# Patient Record
Sex: MALE | Race: WHITE | HISPANIC OR LATINO | Employment: OTHER | URBAN - METROPOLITAN AREA
[De-identification: names, ages, dates, MRNs, and addresses within clinical notes are randomized per-mention and may not be internally consistent; named-entity substitution may affect disease eponyms.]

---

## 2020-11-26 ENCOUNTER — HOSPITAL ENCOUNTER (OUTPATIENT)
Facility: HOSPITAL | Age: 60
Setting detail: HOSPITAL OUTPATIENT SURGERY
Discharge: HOME | End: 2020-11-26
Attending: RADIOLOGY | Admitting: RADIOLOGY

## 2020-11-26 ENCOUNTER — HOSPITAL ENCOUNTER (INPATIENT)
Facility: HOSPITAL | Age: 60
LOS: 5 days | Discharge: HOME | End: 2020-12-01
Attending: INTERNAL MEDICINE | Admitting: FAMILY MEDICINE

## 2020-11-26 VITALS
RESPIRATION RATE: 18 BRPM | HEART RATE: 66 BPM | SYSTOLIC BLOOD PRESSURE: 148 MMHG | DIASTOLIC BLOOD PRESSURE: 81 MMHG | OXYGEN SATURATION: 100 %

## 2020-11-26 DIAGNOSIS — R13.11 ORAL PHASE DYSPHAGIA: ICD-10-CM

## 2020-11-26 DIAGNOSIS — I48.0 PAROXYSMAL ATRIAL FIBRILLATION (HCC): ICD-10-CM

## 2020-11-26 DIAGNOSIS — N18.6 ESRD (END STAGE RENAL DISEASE) (HCC): ICD-10-CM

## 2020-11-26 DIAGNOSIS — R47.01 APHASIA: ICD-10-CM

## 2020-11-26 DIAGNOSIS — H35.033 BLIND HYPERTENSIVE EYE, BILATERAL: ICD-10-CM

## 2020-11-26 DIAGNOSIS — I63.9 ACUTE CVA (CEREBROVASCULAR ACCIDENT) (HCC): Primary | ICD-10-CM

## 2020-11-26 PROBLEM — I10 HTN (HYPERTENSION): Status: ACTIVE | Noted: 2020-11-26

## 2020-11-26 PROBLEM — N18.9 CHRONIC RENAL FAILURE: Status: ACTIVE | Noted: 2020-11-26

## 2020-11-26 PROBLEM — E11.9 DM (DIABETES MELLITUS) (HCC): Status: ACTIVE | Noted: 2020-11-26

## 2020-11-26 LAB — SARS-COV-2 RNA RESP QL NAA+PROBE: NOT DETECTED

## 2020-11-26 PROCEDURE — 25010000002 MIDAZOLAM PER 1 MG: Performed by: RADIOLOGY

## 2020-11-26 PROCEDURE — C1894 INTRO/SHEATH, NON-LASER: HCPCS | Performed by: RADIOLOGY

## 2020-11-26 PROCEDURE — C1887 CATHETER, GUIDING: HCPCS | Performed by: RADIOLOGY

## 2020-11-26 PROCEDURE — C2628 CATHETER, OCCLUSION: HCPCS | Performed by: RADIOLOGY

## 2020-11-26 PROCEDURE — 61645 PERQ ART M-THROMBECT &/NFS: CPT | Performed by: RADIOLOGY

## 2020-11-26 PROCEDURE — 25010000002 FENTANYL CITRATE (PF) 100 MCG/2ML SOLUTION: Performed by: RADIOLOGY

## 2020-11-26 PROCEDURE — C1769 GUIDE WIRE: HCPCS | Performed by: RADIOLOGY

## 2020-11-26 PROCEDURE — 87635 SARS-COV-2 COVID-19 AMP PRB: CPT | Performed by: NURSE PRACTITIONER

## 2020-11-26 PROCEDURE — C1760 CLOSURE DEV, VASC: HCPCS | Performed by: RADIOLOGY

## 2020-11-26 PROCEDURE — 99291 CRITICAL CARE FIRST HOUR: CPT | Performed by: INTERNAL MEDICINE

## 2020-11-26 PROCEDURE — C1773 RET DEV, INSERTABLE: HCPCS | Performed by: RADIOLOGY

## 2020-11-26 PROCEDURE — 99254 IP/OBS CNSLTJ NEW/EST MOD 60: CPT | Performed by: NURSE PRACTITIONER

## 2020-11-26 PROCEDURE — G0269 OCCLUSIVE DEVICE IN VEIN ART: HCPCS | Performed by: RADIOLOGY

## 2020-11-26 PROCEDURE — 0 IODIXANOL PER 1 ML: Performed by: RADIOLOGY

## 2020-11-26 RX ORDER — MIDAZOLAM HYDROCHLORIDE 1 MG/ML
INJECTION INTRAMUSCULAR; INTRAVENOUS AS NEEDED
Status: DISCONTINUED | OUTPATIENT
Start: 2020-11-26 | End: 2020-11-26 | Stop reason: HOSPADM

## 2020-11-26 RX ORDER — ATORVASTATIN CALCIUM 40 MG/1
80 TABLET, FILM COATED ORAL NIGHTLY
Status: DISCONTINUED | OUTPATIENT
Start: 2020-11-26 | End: 2020-11-30

## 2020-11-26 RX ORDER — IODIXANOL 320 MG/ML
INJECTION, SOLUTION INTRAVASCULAR AS NEEDED
Status: DISCONTINUED | OUTPATIENT
Start: 2020-11-26 | End: 2020-11-26 | Stop reason: HOSPADM

## 2020-11-26 RX ORDER — ASPIRIN 300 MG/1
300 SUPPOSITORY RECTAL DAILY
Status: DISCONTINUED | OUTPATIENT
Start: 2020-11-27 | End: 2020-11-27

## 2020-11-26 RX ORDER — SODIUM CHLORIDE 0.9 % (FLUSH) 0.9 %
10 SYRINGE (ML) INJECTION AS NEEDED
Status: DISCONTINUED | OUTPATIENT
Start: 2020-11-26 | End: 2020-12-01 | Stop reason: HOSPADM

## 2020-11-26 RX ORDER — FENTANYL CITRATE 50 UG/ML
INJECTION, SOLUTION INTRAMUSCULAR; INTRAVENOUS AS NEEDED
Status: DISCONTINUED | OUTPATIENT
Start: 2020-11-26 | End: 2020-11-26 | Stop reason: HOSPADM

## 2020-11-26 RX ORDER — ASPIRIN 325 MG
325 TABLET ORAL DAILY
Status: DISCONTINUED | OUTPATIENT
Start: 2020-11-27 | End: 2020-11-27

## 2020-11-26 RX ORDER — SODIUM CHLORIDE 0.9 % (FLUSH) 0.9 %
10 SYRINGE (ML) INJECTION EVERY 12 HOURS SCHEDULED
Status: DISCONTINUED | OUTPATIENT
Start: 2020-11-26 | End: 2020-12-01 | Stop reason: HOSPADM

## 2020-11-27 ENCOUNTER — APPOINTMENT (OUTPATIENT)
Dept: NEPHROLOGY | Facility: HOSPITAL | Age: 60
End: 2020-11-27

## 2020-11-27 ENCOUNTER — APPOINTMENT (OUTPATIENT)
Dept: CT IMAGING | Facility: HOSPITAL | Age: 60
End: 2020-11-27

## 2020-11-27 ENCOUNTER — APPOINTMENT (OUTPATIENT)
Dept: CARDIOLOGY | Facility: HOSPITAL | Age: 60
End: 2020-11-27

## 2020-11-27 LAB
ALBUMIN SERPL-MCNC: 3.3 G/DL (ref 3.5–5.2)
ALBUMIN/GLOB SERPL: 1.2 G/DL
ALP SERPL-CCNC: 99 U/L (ref 39–117)
ALT SERPL W P-5'-P-CCNC: 10 U/L (ref 1–41)
ANION GAP SERPL CALCULATED.3IONS-SCNC: 13 MMOL/L (ref 5–15)
AST SERPL-CCNC: 7 U/L (ref 1–40)
BASOPHILS # BLD AUTO: 0.07 10*3/MM3 (ref 0–0.2)
BASOPHILS NFR BLD AUTO: 1 % (ref 0–1.5)
BILIRUB SERPL-MCNC: 0.6 MG/DL (ref 0–1.2)
BUN SERPL-MCNC: 41 MG/DL (ref 6–20)
BUN/CREAT SERPL: 4.5 (ref 7–25)
CALCIUM SPEC-SCNC: 8.7 MG/DL (ref 8.6–10.5)
CHLORIDE SERPL-SCNC: 97 MMOL/L (ref 98–107)
CHOLEST SERPL-MCNC: 76 MG/DL (ref 0–200)
CO2 SERPL-SCNC: 29 MMOL/L (ref 22–29)
CREAT SERPL-MCNC: 9.12 MG/DL (ref 0.76–1.27)
DEPRECATED RDW RBC AUTO: 54.6 FL (ref 37–54)
EOSINOPHIL # BLD AUTO: 0.16 10*3/MM3 (ref 0–0.4)
EOSINOPHIL NFR BLD AUTO: 2.4 % (ref 0.3–6.2)
ERYTHROCYTE [DISTWIDTH] IN BLOOD BY AUTOMATED COUNT: 15.7 % (ref 12.3–15.4)
GFR SERPL CREATININE-BSD FRML MDRD: 6 ML/MIN/1.73
GFR SERPL CREATININE-BSD FRML MDRD: 7 ML/MIN/1.73
GLOBULIN UR ELPH-MCNC: 2.8 GM/DL
GLUCOSE SERPL-MCNC: 98 MG/DL (ref 65–99)
HBA1C MFR BLD: 6 % (ref 4.8–5.6)
HCT VFR BLD AUTO: 29.5 % (ref 37.5–51)
HDLC SERPL-MCNC: 34 MG/DL (ref 40–60)
HGB BLD-MCNC: 9.7 G/DL (ref 13–17.7)
IMM GRANULOCYTES # BLD AUTO: 0.03 10*3/MM3 (ref 0–0.05)
IMM GRANULOCYTES NFR BLD AUTO: 0.4 % (ref 0–0.5)
LDLC SERPL CALC-MCNC: 27 MG/DL (ref 0–100)
LDLC/HDLC SERPL: 0.85 {RATIO}
LYMPHOCYTES # BLD AUTO: 0.83 10*3/MM3 (ref 0.7–3.1)
LYMPHOCYTES NFR BLD AUTO: 12.4 % (ref 19.6–45.3)
MAGNESIUM SERPL-MCNC: 2.2 MG/DL (ref 1.6–2.6)
MCH RBC QN AUTO: 32.4 PG (ref 26.6–33)
MCHC RBC AUTO-ENTMCNC: 32.9 G/DL (ref 31.5–35.7)
MCV RBC AUTO: 98.7 FL (ref 79–97)
MONOCYTES # BLD AUTO: 0.74 10*3/MM3 (ref 0.1–0.9)
MONOCYTES NFR BLD AUTO: 11.1 % (ref 5–12)
NEUTROPHILS NFR BLD AUTO: 4.84 10*3/MM3 (ref 1.7–7)
NEUTROPHILS NFR BLD AUTO: 72.7 % (ref 42.7–76)
NRBC BLD AUTO-RTO: 0 /100 WBC (ref 0–0.2)
PHOSPHATE SERPL-MCNC: 5.5 MG/DL (ref 2.5–4.5)
PLATELET # BLD AUTO: 101 10*3/MM3 (ref 140–450)
PMV BLD AUTO: 10.3 FL (ref 6–12)
POTASSIUM SERPL-SCNC: 3.6 MMOL/L (ref 3.5–5.2)
PROT SERPL-MCNC: 6.1 G/DL (ref 6–8.5)
RBC # BLD AUTO: 2.99 10*6/MM3 (ref 4.14–5.8)
SODIUM SERPL-SCNC: 139 MMOL/L (ref 136–145)
TRIGL SERPL-MCNC: 65 MG/DL (ref 0–150)
VLDLC SERPL-MCNC: 15 MG/DL (ref 5–40)
WBC # BLD AUTO: 6.67 10*3/MM3 (ref 3.4–10.8)

## 2020-11-27 PROCEDURE — 93306 TTE W/DOPPLER COMPLETE: CPT

## 2020-11-27 PROCEDURE — 03JY3ZZ INSPECTION OF UPPER ARTERY, PERCUTANEOUS APPROACH: ICD-10-PCS | Performed by: RADIOLOGY

## 2020-11-27 PROCEDURE — B3171ZZ FLUOROSCOPY OF LEFT INTERNAL CAROTID ARTERY USING LOW OSMOLAR CONTRAST: ICD-10-PCS | Performed by: RADIOLOGY

## 2020-11-27 PROCEDURE — 99233 SBSQ HOSP IP/OBS HIGH 50: CPT | Performed by: INTERNAL MEDICINE

## 2020-11-27 PROCEDURE — 83036 HEMOGLOBIN GLYCOSYLATED A1C: CPT | Performed by: RADIOLOGY

## 2020-11-27 PROCEDURE — 70450 CT HEAD/BRAIN W/O DYE: CPT

## 2020-11-27 PROCEDURE — 92610 EVALUATE SWALLOWING FUNCTION: CPT

## 2020-11-27 PROCEDURE — 83735 ASSAY OF MAGNESIUM: CPT | Performed by: INTERNAL MEDICINE

## 2020-11-27 PROCEDURE — 93306 TTE W/DOPPLER COMPLETE: CPT | Performed by: INTERNAL MEDICINE

## 2020-11-27 PROCEDURE — 97165 OT EVAL LOW COMPLEX 30 MIN: CPT

## 2020-11-27 PROCEDURE — 85025 COMPLETE CBC W/AUTO DIFF WBC: CPT | Performed by: INTERNAL MEDICINE

## 2020-11-27 PROCEDURE — 5A1D70Z PERFORMANCE OF URINARY FILTRATION, INTERMITTENT, LESS THAN 6 HOURS PER DAY: ICD-10-PCS | Performed by: INTERNAL MEDICINE

## 2020-11-27 PROCEDURE — 97162 PT EVAL MOD COMPLEX 30 MIN: CPT

## 2020-11-27 PROCEDURE — 84100 ASSAY OF PHOSPHORUS: CPT | Performed by: INTERNAL MEDICINE

## 2020-11-27 PROCEDURE — 80061 LIPID PANEL: CPT | Performed by: RADIOLOGY

## 2020-11-27 PROCEDURE — 80053 COMPREHEN METABOLIC PANEL: CPT | Performed by: INTERNAL MEDICINE

## 2020-11-27 PROCEDURE — B3141ZZ FLUOROSCOPY OF LEFT COMMON CAROTID ARTERY USING LOW OSMOLAR CONTRAST: ICD-10-PCS | Performed by: RADIOLOGY

## 2020-11-27 PROCEDURE — B31R1ZZ FLUOROSCOPY OF INTRACRANIAL ARTERIES USING LOW OSMOLAR CONTRAST: ICD-10-PCS | Performed by: RADIOLOGY

## 2020-11-27 RX ORDER — ASPIRIN 81 MG/1
81 TABLET, CHEWABLE ORAL DAILY
Status: DISCONTINUED | OUTPATIENT
Start: 2020-11-27 | End: 2020-12-01 | Stop reason: HOSPADM

## 2020-11-27 RX ORDER — CLOPIDOGREL BISULFATE 75 MG/1
75 TABLET ORAL DAILY
Status: DISCONTINUED | OUTPATIENT
Start: 2020-11-27 | End: 2020-12-01

## 2020-11-27 RX ADMIN — SODIUM CHLORIDE, PRESERVATIVE FREE 10 ML: 5 INJECTION INTRAVENOUS at 21:16

## 2020-11-27 RX ADMIN — ATORVASTATIN CALCIUM 80 MG: 40 TABLET, FILM COATED ORAL at 21:16

## 2020-11-27 RX ADMIN — CLOPIDOGREL BISULFATE 75 MG: 75 TABLET ORAL at 21:16

## 2020-11-27 RX ADMIN — SODIUM CHLORIDE, PRESERVATIVE FREE 10 ML: 5 INJECTION INTRAVENOUS at 08:17

## 2020-11-27 RX ADMIN — ASPIRIN 81 MG CHEWABLE TABLET 81 MG: 81 TABLET CHEWABLE at 21:15

## 2020-11-28 ENCOUNTER — APPOINTMENT (OUTPATIENT)
Dept: MRI IMAGING | Facility: HOSPITAL | Age: 60
End: 2020-11-28

## 2020-11-28 LAB
ANION GAP SERPL CALCULATED.3IONS-SCNC: 13 MMOL/L (ref 5–15)
BUN SERPL-MCNC: 26 MG/DL (ref 6–20)
BUN/CREAT SERPL: 3.6 (ref 7–25)
CALCIUM SPEC-SCNC: 9.1 MG/DL (ref 8.6–10.5)
CHLORIDE SERPL-SCNC: 103 MMOL/L (ref 98–107)
CO2 SERPL-SCNC: 24 MMOL/L (ref 22–29)
CREAT SERPL-MCNC: 7.26 MG/DL (ref 0.76–1.27)
DEPRECATED RDW RBC AUTO: 58.9 FL (ref 37–54)
ERYTHROCYTE [DISTWIDTH] IN BLOOD BY AUTOMATED COUNT: 15.8 % (ref 12.3–15.4)
GFR SERPL CREATININE-BSD FRML MDRD: 8 ML/MIN/1.73
GFR SERPL CREATININE-BSD FRML MDRD: ABNORMAL ML/MIN/{1.73_M2}
GLUCOSE SERPL-MCNC: 77 MG/DL (ref 65–99)
HAV IGM SERPL QL IA: NORMAL
HBV CORE IGM SERPL QL IA: NORMAL
HBV SURFACE AG SERPL QL IA: NORMAL
HCT VFR BLD AUTO: 31.1 % (ref 37.5–51)
HCV AB SER DONR QL: NORMAL
HGB BLD-MCNC: 10.2 G/DL (ref 13–17.7)
MAGNESIUM SERPL-MCNC: 2.2 MG/DL (ref 1.6–2.6)
MCH RBC QN AUTO: 33.9 PG (ref 26.6–33)
MCHC RBC AUTO-ENTMCNC: 32.8 G/DL (ref 31.5–35.7)
MCV RBC AUTO: 103.3 FL (ref 79–97)
PHOSPHATE SERPL-MCNC: 5.1 MG/DL (ref 2.5–4.5)
PLATELET # BLD AUTO: 92 10*3/MM3 (ref 140–450)
PMV BLD AUTO: 11.1 FL (ref 6–12)
POTASSIUM SERPL-SCNC: 4.1 MMOL/L (ref 3.5–5.2)
RBC # BLD AUTO: 3.01 10*6/MM3 (ref 4.14–5.8)
SODIUM SERPL-SCNC: 140 MMOL/L (ref 136–145)
WBC # BLD AUTO: 5.32 10*3/MM3 (ref 3.4–10.8)

## 2020-11-28 PROCEDURE — 99232 SBSQ HOSP IP/OBS MODERATE 35: CPT | Performed by: INTERNAL MEDICINE

## 2020-11-28 PROCEDURE — 92523 SPEECH SOUND LANG COMPREHEN: CPT

## 2020-11-28 PROCEDURE — 84100 ASSAY OF PHOSPHORUS: CPT | Performed by: INTERNAL MEDICINE

## 2020-11-28 PROCEDURE — 99232 SBSQ HOSP IP/OBS MODERATE 35: CPT | Performed by: PSYCHIATRY & NEUROLOGY

## 2020-11-28 PROCEDURE — 92610 EVALUATE SWALLOWING FUNCTION: CPT

## 2020-11-28 PROCEDURE — 83735 ASSAY OF MAGNESIUM: CPT | Performed by: INTERNAL MEDICINE

## 2020-11-28 PROCEDURE — 85027 COMPLETE CBC AUTOMATED: CPT | Performed by: INTERNAL MEDICINE

## 2020-11-28 PROCEDURE — 80048 BASIC METABOLIC PNL TOTAL CA: CPT | Performed by: INTERNAL MEDICINE

## 2020-11-28 PROCEDURE — 70551 MRI BRAIN STEM W/O DYE: CPT

## 2020-11-28 PROCEDURE — 63710000001 INSULIN REGULAR HUMAN PER 5 UNITS: Performed by: INTERNAL MEDICINE

## 2020-11-28 PROCEDURE — 80074 ACUTE HEPATITIS PANEL: CPT | Performed by: INTERNAL MEDICINE

## 2020-11-28 RX ADMIN — ATORVASTATIN CALCIUM 80 MG: 40 TABLET, FILM COATED ORAL at 20:45

## 2020-11-28 RX ADMIN — ASPIRIN 81 MG CHEWABLE TABLET 81 MG: 81 TABLET CHEWABLE at 08:03

## 2020-11-28 RX ADMIN — SODIUM CHLORIDE, PRESERVATIVE FREE 10 ML: 5 INJECTION INTRAVENOUS at 08:07

## 2020-11-28 RX ADMIN — CLOPIDOGREL BISULFATE 75 MG: 75 TABLET ORAL at 08:03

## 2020-11-28 RX ADMIN — INSULIN HUMAN 3 UNITS: 100 INJECTION, SOLUTION PARENTERAL at 17:37

## 2020-11-29 LAB
ANION GAP SERPL CALCULATED.3IONS-SCNC: 15 MMOL/L (ref 5–15)
BUN SERPL-MCNC: 38 MG/DL (ref 6–20)
BUN/CREAT SERPL: 3.8 (ref 7–25)
CALCIUM SPEC-SCNC: 8.8 MG/DL (ref 8.6–10.5)
CHLORIDE SERPL-SCNC: 99 MMOL/L (ref 98–107)
CO2 SERPL-SCNC: 23 MMOL/L (ref 22–29)
CREAT SERPL-MCNC: 10.02 MG/DL (ref 0.76–1.27)
DEPRECATED RDW RBC AUTO: 57.4 FL (ref 37–54)
ERYTHROCYTE [DISTWIDTH] IN BLOOD BY AUTOMATED COUNT: 15.5 % (ref 12.3–15.4)
GFR SERPL CREATININE-BSD FRML MDRD: 5 ML/MIN/1.73
GFR SERPL CREATININE-BSD FRML MDRD: ABNORMAL ML/MIN/{1.73_M2}
GLUCOSE BLDC GLUCOMTR-MCNC: 106 MG/DL (ref 70–130)
GLUCOSE BLDC GLUCOMTR-MCNC: 162 MG/DL (ref 70–130)
GLUCOSE BLDC GLUCOMTR-MCNC: 76 MG/DL (ref 70–130)
GLUCOSE BLDC GLUCOMTR-MCNC: 81 MG/DL (ref 70–130)
GLUCOSE SERPL-MCNC: 61 MG/DL (ref 65–99)
HBV SURFACE AG SERPL QL IA: NORMAL
HCT VFR BLD AUTO: 29.8 % (ref 37.5–51)
HGB BLD-MCNC: 9.8 G/DL (ref 13–17.7)
MAGNESIUM SERPL-MCNC: 2.3 MG/DL (ref 1.6–2.6)
MCH RBC QN AUTO: 33.7 PG (ref 26.6–33)
MCHC RBC AUTO-ENTMCNC: 32.9 G/DL (ref 31.5–35.7)
MCV RBC AUTO: 102.4 FL (ref 79–97)
PHOSPHATE SERPL-MCNC: 5.1 MG/DL (ref 2.5–4.5)
PLATELET # BLD AUTO: 99 10*3/MM3 (ref 140–450)
PMV BLD AUTO: 10.7 FL (ref 6–12)
POTASSIUM SERPL-SCNC: 4 MMOL/L (ref 3.5–5.2)
RBC # BLD AUTO: 2.91 10*6/MM3 (ref 4.14–5.8)
SODIUM SERPL-SCNC: 137 MMOL/L (ref 136–145)
WBC # BLD AUTO: 5.69 10*3/MM3 (ref 3.4–10.8)

## 2020-11-29 PROCEDURE — 80048 BASIC METABOLIC PNL TOTAL CA: CPT | Performed by: INTERNAL MEDICINE

## 2020-11-29 PROCEDURE — 63710000001 INSULIN REGULAR HUMAN PER 5 UNITS: Performed by: INTERNAL MEDICINE

## 2020-11-29 PROCEDURE — 82962 GLUCOSE BLOOD TEST: CPT

## 2020-11-29 PROCEDURE — 83735 ASSAY OF MAGNESIUM: CPT | Performed by: INTERNAL MEDICINE

## 2020-11-29 PROCEDURE — 99233 SBSQ HOSP IP/OBS HIGH 50: CPT | Performed by: NURSE PRACTITIONER

## 2020-11-29 PROCEDURE — 87340 HEPATITIS B SURFACE AG IA: CPT | Performed by: INTERNAL MEDICINE

## 2020-11-29 PROCEDURE — 99232 SBSQ HOSP IP/OBS MODERATE 35: CPT | Performed by: FAMILY MEDICINE

## 2020-11-29 PROCEDURE — 85027 COMPLETE CBC AUTOMATED: CPT | Performed by: INTERNAL MEDICINE

## 2020-11-29 PROCEDURE — 84100 ASSAY OF PHOSPHORUS: CPT | Performed by: INTERNAL MEDICINE

## 2020-11-29 RX ORDER — ALBUMIN (HUMAN) 12.5 G/50ML
12.5 SOLUTION INTRAVENOUS AS NEEDED
Status: ACTIVE | OUTPATIENT
Start: 2020-11-29 | End: 2020-11-30

## 2020-11-29 RX ORDER — AMLODIPINE BESYLATE 5 MG/1
5 TABLET ORAL
Status: DISCONTINUED | OUTPATIENT
Start: 2020-11-29 | End: 2020-11-30

## 2020-11-29 RX ADMIN — INSULIN HUMAN 3 UNITS: 100 INJECTION, SOLUTION PARENTERAL at 17:27

## 2020-11-29 RX ADMIN — ASPIRIN 81 MG CHEWABLE TABLET 81 MG: 81 TABLET CHEWABLE at 08:33

## 2020-11-29 RX ADMIN — SODIUM CHLORIDE, PRESERVATIVE FREE 10 ML: 5 INJECTION INTRAVENOUS at 20:54

## 2020-11-29 RX ADMIN — CLOPIDOGREL BISULFATE 75 MG: 75 TABLET ORAL at 08:33

## 2020-11-29 RX ADMIN — SODIUM CHLORIDE, PRESERVATIVE FREE 10 ML: 5 INJECTION INTRAVENOUS at 08:33

## 2020-11-29 RX ADMIN — AMLODIPINE BESYLATE 5 MG: 5 TABLET ORAL at 13:01

## 2020-11-29 RX ADMIN — ATORVASTATIN CALCIUM 80 MG: 40 TABLET, FILM COATED ORAL at 20:54

## 2020-11-30 ENCOUNTER — APPOINTMENT (OUTPATIENT)
Dept: NEPHROLOGY | Facility: HOSPITAL | Age: 60
End: 2020-11-30

## 2020-11-30 LAB
GLUCOSE BLDC GLUCOMTR-MCNC: 136 MG/DL (ref 70–130)
GLUCOSE BLDC GLUCOMTR-MCNC: 141 MG/DL (ref 70–130)
GLUCOSE BLDC GLUCOMTR-MCNC: 72 MG/DL (ref 70–130)
GLUCOSE BLDC GLUCOMTR-MCNC: 73 MG/DL (ref 70–130)
GLUCOSE BLDC GLUCOMTR-MCNC: 81 MG/DL (ref 70–130)

## 2020-11-30 PROCEDURE — 82962 GLUCOSE BLOOD TEST: CPT

## 2020-11-30 PROCEDURE — 99232 SBSQ HOSP IP/OBS MODERATE 35: CPT | Performed by: CLINICAL NURSE SPECIALIST

## 2020-11-30 PROCEDURE — 99232 SBSQ HOSP IP/OBS MODERATE 35: CPT | Performed by: FAMILY MEDICINE

## 2020-11-30 PROCEDURE — 25010000002 EPOETIN ALFA-EPBX 10000 UNIT/ML SOLUTION: Performed by: INTERNAL MEDICINE

## 2020-11-30 RX ORDER — DEXTROSE MONOHYDRATE 25 G/50ML
25 INJECTION, SOLUTION INTRAVENOUS
Status: DISCONTINUED | OUTPATIENT
Start: 2020-11-30 | End: 2020-12-01 | Stop reason: HOSPADM

## 2020-11-30 RX ORDER — ATORVASTATIN CALCIUM 40 MG/1
40 TABLET, FILM COATED ORAL NIGHTLY
Status: DISCONTINUED | OUTPATIENT
Start: 2020-11-30 | End: 2020-12-01 | Stop reason: HOSPADM

## 2020-11-30 RX ORDER — NICOTINE POLACRILEX 4 MG
15 LOZENGE BUCCAL
Status: DISCONTINUED | OUTPATIENT
Start: 2020-11-30 | End: 2020-12-01 | Stop reason: HOSPADM

## 2020-11-30 RX ORDER — AMLODIPINE BESYLATE 5 MG/1
5 TABLET ORAL ONCE
Status: DISCONTINUED | OUTPATIENT
Start: 2020-11-30 | End: 2020-11-30

## 2020-11-30 RX ORDER — HYDRALAZINE HYDROCHLORIDE 25 MG/1
25 TABLET, FILM COATED ORAL EVERY 8 HOURS SCHEDULED
Status: DISCONTINUED | OUTPATIENT
Start: 2020-11-30 | End: 2020-12-01 | Stop reason: HOSPADM

## 2020-11-30 RX ORDER — AMLODIPINE BESYLATE 10 MG/1
10 TABLET ORAL
Status: DISCONTINUED | OUTPATIENT
Start: 2020-11-30 | End: 2020-12-01 | Stop reason: HOSPADM

## 2020-11-30 RX ADMIN — EPOETIN ALFA-EPBX 10000 UNITS: 10000 INJECTION, SOLUTION INTRAVENOUS; SUBCUTANEOUS at 15:11

## 2020-11-30 RX ADMIN — ASPIRIN 81 MG CHEWABLE TABLET 81 MG: 81 TABLET CHEWABLE at 13:47

## 2020-11-30 RX ADMIN — HYDRALAZINE HYDROCHLORIDE 25 MG: 25 TABLET, FILM COATED ORAL at 13:51

## 2020-11-30 RX ADMIN — ATORVASTATIN CALCIUM 40 MG: 40 TABLET, FILM COATED ORAL at 21:06

## 2020-11-30 RX ADMIN — CLOPIDOGREL BISULFATE 75 MG: 75 TABLET ORAL at 13:47

## 2020-11-30 RX ADMIN — HYDRALAZINE HYDROCHLORIDE 25 MG: 25 TABLET, FILM COATED ORAL at 21:06

## 2020-11-30 RX ADMIN — SODIUM CHLORIDE, PRESERVATIVE FREE 10 ML: 5 INJECTION INTRAVENOUS at 13:47

## 2020-11-30 RX ADMIN — AMLODIPINE BESYLATE 10 MG: 10 TABLET ORAL at 13:50

## 2020-12-01 ENCOUNTER — APPOINTMENT (OUTPATIENT)
Dept: CARDIOLOGY | Facility: HOSPITAL | Age: 60
End: 2020-12-01

## 2020-12-01 VITALS
HEART RATE: 62 BPM | BODY MASS INDEX: 24.43 KG/M2 | DIASTOLIC BLOOD PRESSURE: 91 MMHG | OXYGEN SATURATION: 100 % | WEIGHT: 152 LBS | RESPIRATION RATE: 16 BRPM | SYSTOLIC BLOOD PRESSURE: 158 MMHG | TEMPERATURE: 97.6 F | HEIGHT: 66 IN

## 2020-12-01 LAB
ASCENDING AORTA: 3.6 CM
BH CV ECHO MEAS - AI DEC SLOPE: 172 CM/SEC^2
BH CV ECHO MEAS - AI MAX PG: 57.2 MMHG
BH CV ECHO MEAS - AI MAX VEL: 378 CM/SEC
BH CV ECHO MEAS - AI P1/2T: 378 MSEC
BH CV ECHO MEAS - AO MEAN PG: 3 MMHG
BH CV ECHO MEAS - AO ROOT AREA (BSA CORRECTED): 2.4
BH CV ECHO MEAS - AO ROOT AREA: 13.2 CM^2
BH CV ECHO MEAS - AO ROOT DIAM: 4.1 CM
BH CV ECHO MEAS - AO V2 MAX: 83 CM/SEC
BH CV ECHO MEAS - BSA(HAYCOCK): 1.8 M^2
BH CV ECHO MEAS - BSA(HAYCOCK): 1.8 M^2
BH CV ECHO MEAS - BSA: 1.7 M^2
BH CV ECHO MEAS - BSA: 1.8 M^2
BH CV ECHO MEAS - BZI_BMI: 24.7 KILOGRAMS/M^2
BH CV ECHO MEAS - BZI_BMI: 28 KILOGRAMS/M^2
BH CV ECHO MEAS - BZI_METRIC_HEIGHT: 157 CM
BH CV ECHO MEAS - BZI_METRIC_HEIGHT: 167 CM
BH CV ECHO MEAS - BZI_METRIC_WEIGHT: 68.9 KG
BH CV ECHO MEAS - BZI_METRIC_WEIGHT: 68.9 KG
BH CV ECHO MEAS - EDV(CUBED): 132.7 ML
BH CV ECHO MEAS - EDV(MOD-SP2): 167 ML
BH CV ECHO MEAS - EDV(MOD-SP4): 139 ML
BH CV ECHO MEAS - EDV(TEICH): 123.8 ML
BH CV ECHO MEAS - EF(CUBED): 51.8 %
BH CV ECHO MEAS - EF(MOD-BP): 39.7 %
BH CV ECHO MEAS - EF(MOD-SP2): 41 %
BH CV ECHO MEAS - EF(MOD-SP4): 35.9 %
BH CV ECHO MEAS - EF(TEICH): 43.5 %
BH CV ECHO MEAS - EF_3D-VOL: 50 %
BH CV ECHO MEAS - ESV(CUBED): 64 ML
BH CV ECHO MEAS - ESV(MOD-SP2): 98.6 ML
BH CV ECHO MEAS - ESV(MOD-SP4): 89.1 ML
BH CV ECHO MEAS - ESV(TEICH): 70 ML
BH CV ECHO MEAS - FS: 21.6 %
BH CV ECHO MEAS - IVS/LVPW: 0.92
BH CV ECHO MEAS - IVSD: 1.1 CM
BH CV ECHO MEAS - LA DIMENSION: 3.9 CM
BH CV ECHO MEAS - LA/AO: 0.95
BH CV ECHO MEAS - LAT PEAK E' VEL: 6 CM/SEC
BH CV ECHO MEAS - LATERAL E/E' RATIO: 14.2
BH CV ECHO MEAS - LV DIASTOLIC VOL/BSA (35-75): 81.9 ML/M^2
BH CV ECHO MEAS - LV MASS(C)D: 227.4 GRAMS
BH CV ECHO MEAS - LV MASS(C)DI: 134 GRAMS/M^2
BH CV ECHO MEAS - LV MAX PG: 2.1 MMHG
BH CV ECHO MEAS - LV MEAN PG: 1 MMHG
BH CV ECHO MEAS - LV SYSTOLIC VOL/BSA (12-30): 52.5 ML/M^2
BH CV ECHO MEAS - LV V1 MAX: 72.5 CM/SEC
BH CV ECHO MEAS - LV V1 MEAN: 41.5 CM/SEC
BH CV ECHO MEAS - LV V1 VTI: 14.4 CM
BH CV ECHO MEAS - LVIDD: 5.1 CM
BH CV ECHO MEAS - LVIDS: 4 CM
BH CV ECHO MEAS - LVLD AP2: 9.3 CM
BH CV ECHO MEAS - LVLD AP4: 8.8 CM
BH CV ECHO MEAS - LVLS AP2: 8 CM
BH CV ECHO MEAS - LVLS AP4: 7.6 CM
BH CV ECHO MEAS - LVOT AREA (M): 3.1 CM^2
BH CV ECHO MEAS - LVOT AREA: 3.1 CM^2
BH CV ECHO MEAS - LVOT DIAM: 2 CM
BH CV ECHO MEAS - LVPWD: 1.2 CM
BH CV ECHO MEAS - MED PEAK E' VEL: 4.5 CM/SEC
BH CV ECHO MEAS - MEDIAL E/E' RATIO: 19
BH CV ECHO MEAS - MV A MAX VEL: 31.7 CM/SEC
BH CV ECHO MEAS - MV DEC SLOPE: 455.5 CM/SEC^2
BH CV ECHO MEAS - MV DEC TIME: 0.16 SEC
BH CV ECHO MEAS - MV E MAX VEL: 84.8 CM/SEC
BH CV ECHO MEAS - MV E/A: 2.7
BH CV ECHO MEAS - MV MAX PG: 3.8 MMHG
BH CV ECHO MEAS - MV MEAN PG: 1.2 MMHG
BH CV ECHO MEAS - MV P1/2T MAX VEL: 109 CM/SEC
BH CV ECHO MEAS - MV P1/2T: 70.1 MSEC
BH CV ECHO MEAS - MV V2 MAX: 97.3 CM/SEC
BH CV ECHO MEAS - MV V2 MEAN: 48.6 CM/SEC
BH CV ECHO MEAS - MV V2 VTI: 23.3 CM
BH CV ECHO MEAS - MVA P1/2T LCG: 2 CM^2
BH CV ECHO MEAS - MVA(P1/2T): 3.1 CM^2
BH CV ECHO MEAS - PA ACC TIME: 0.06 SEC
BH CV ECHO MEAS - PA PR(ACCEL): 52 MMHG
BH CV ECHO MEAS - RAP SYSTOLE: 8 MMHG
BH CV ECHO MEAS - RAP SYSTOLE: 8 MMHG
BH CV ECHO MEAS - RVSP: 34 MMHG
BH CV ECHO MEAS - RVSP: 53 MMHG
BH CV ECHO MEAS - SI(CUBED): 40.4 ML/M^2
BH CV ECHO MEAS - SI(LVOT): 26.7 ML/M^2
BH CV ECHO MEAS - SI(MOD-SP2): 40.3 ML/M^2
BH CV ECHO MEAS - SI(MOD-SP4): 29.4 ML/M^2
BH CV ECHO MEAS - SI(TEICH): 31.7 ML/M^2
BH CV ECHO MEAS - SV(CUBED): 68.7 ML
BH CV ECHO MEAS - SV(LVOT): 45.2 ML
BH CV ECHO MEAS - SV(MOD-SP2): 68.4 ML
BH CV ECHO MEAS - SV(MOD-SP4): 49.9 ML
BH CV ECHO MEAS - SV(TEICH): 53.8 ML
BH CV ECHO MEAS - TAPSE (>1.6): 1.2 CM
BH CV ECHO MEAS - TR MAX PG: 26 MMHG
BH CV ECHO MEAS - TR MAX PG: 45 MMHG
BH CV ECHO MEAS - TR MAX VEL: 253.7 CM/SEC
BH CV ECHO MEAS - TR MAX VEL: 337 CM/SEC
BH CV ECHO MEASUREMENTS AVERAGE E/E' RATIO: 16.15
BH CV VAS BP LEFT ARM: NORMAL MMHG
BH CV XLRA - RV BASE: 3.3 CM
BH CV XLRA - RV LENGTH: 4.9 CM
BH CV XLRA - RV MID: 2.3 CM
BH CV XLRA - TDI S': 9.7 CM/SEC
GLUCOSE BLDC GLUCOMTR-MCNC: 82 MG/DL (ref 70–130)
GLUCOSE BLDC GLUCOMTR-MCNC: 84 MG/DL (ref 70–130)
LEFT ATRIUM VOLUME INDEX: 58 ML/M2
LV EF 2D ECHO EST: 45 %
LV EF 2D ECHO EST: 55 %
MAXIMAL PREDICTED HEART RATE: 161 BPM
STRESS TARGET HR: 137 BPM

## 2020-12-01 PROCEDURE — 25010000002 FENTANYL CITRATE (PF) 100 MCG/2ML SOLUTION: Performed by: INTERNAL MEDICINE

## 2020-12-01 PROCEDURE — 92507 TX SP LANG VOICE COMM INDIV: CPT

## 2020-12-01 PROCEDURE — 99239 HOSP IP/OBS DSCHRG MGMT >30: CPT | Performed by: NURSE PRACTITIONER

## 2020-12-01 PROCEDURE — 93325 DOPPLER ECHO COLOR FLOW MAPG: CPT | Performed by: INTERNAL MEDICINE

## 2020-12-01 PROCEDURE — 99232 SBSQ HOSP IP/OBS MODERATE 35: CPT | Performed by: CLINICAL NURSE SPECIALIST

## 2020-12-01 PROCEDURE — 99152 MOD SED SAME PHYS/QHP 5/>YRS: CPT | Performed by: INTERNAL MEDICINE

## 2020-12-01 PROCEDURE — 25010000002 MIDAZOLAM PER 1 MG: Performed by: INTERNAL MEDICINE

## 2020-12-01 PROCEDURE — 82962 GLUCOSE BLOOD TEST: CPT

## 2020-12-01 PROCEDURE — 97110 THERAPEUTIC EXERCISES: CPT

## 2020-12-01 PROCEDURE — 93312 ECHO TRANSESOPHAGEAL: CPT | Performed by: INTERNAL MEDICINE

## 2020-12-01 PROCEDURE — 97116 GAIT TRAINING THERAPY: CPT

## 2020-12-01 PROCEDURE — 93320 DOPPLER ECHO COMPLETE: CPT

## 2020-12-01 PROCEDURE — 93312 ECHO TRANSESOPHAGEAL: CPT

## 2020-12-01 PROCEDURE — 93325 DOPPLER ECHO COLOR FLOW MAPG: CPT

## 2020-12-01 PROCEDURE — 93320 DOPPLER ECHO COMPLETE: CPT | Performed by: INTERNAL MEDICINE

## 2020-12-01 RX ORDER — MIDAZOLAM HYDROCHLORIDE 1 MG/ML
INJECTION INTRAMUSCULAR; INTRAVENOUS
Status: COMPLETED | OUTPATIENT
Start: 2020-12-01 | End: 2020-12-01

## 2020-12-01 RX ORDER — HYDRALAZINE HYDROCHLORIDE 25 MG/1
25 TABLET, FILM COATED ORAL EVERY 8 HOURS SCHEDULED
Qty: 90 TABLET | Refills: 0 | Status: SHIPPED | OUTPATIENT
Start: 2020-12-01 | End: 2021-03-01

## 2020-12-01 RX ORDER — ALBUMIN (HUMAN) 12.5 G/50ML
12.5 SOLUTION INTRAVENOUS AS NEEDED
Status: CANCELLED | OUTPATIENT
Start: 2020-12-02 | End: 2020-12-02

## 2020-12-01 RX ORDER — AMLODIPINE BESYLATE 10 MG/1
10 TABLET ORAL
Qty: 30 TABLET | Refills: 0 | Status: SHIPPED | OUTPATIENT
Start: 2020-12-02

## 2020-12-01 RX ORDER — ASPIRIN 81 MG/1
81 TABLET, CHEWABLE ORAL DAILY
Qty: 30 TABLET | Refills: 0 | Status: SHIPPED | OUTPATIENT
Start: 2020-12-02 | End: 2021-03-01

## 2020-12-01 RX ORDER — FENTANYL CITRATE 50 UG/ML
INJECTION, SOLUTION INTRAMUSCULAR; INTRAVENOUS
Status: COMPLETED | OUTPATIENT
Start: 2020-12-01 | End: 2020-12-01

## 2020-12-01 RX ORDER — ATORVASTATIN CALCIUM 40 MG/1
40 TABLET, FILM COATED ORAL NIGHTLY
Qty: 30 TABLET | Refills: 0 | Status: SHIPPED | OUTPATIENT
Start: 2020-12-01 | End: 2021-01-18 | Stop reason: HOSPADM

## 2020-12-01 RX ADMIN — HYDRALAZINE HYDROCHLORIDE 25 MG: 25 TABLET, FILM COATED ORAL at 14:54

## 2020-12-01 RX ADMIN — SODIUM CHLORIDE, PRESERVATIVE FREE 10 ML: 5 INJECTION INTRAVENOUS at 06:34

## 2020-12-01 RX ADMIN — MIDAZOLAM 2 MG: 1 INJECTION INTRAMUSCULAR; INTRAVENOUS at 13:12

## 2020-12-01 RX ADMIN — METHOHEXITAL SODIUM 30 MG: 500 INJECTION, POWDER, LYOPHILIZED, FOR SOLUTION INTRAMUSCULAR; INTRAVENOUS; RECTAL at 13:16

## 2020-12-01 RX ADMIN — FENTANYL CITRATE 50 MCG: 0.05 INJECTION, SOLUTION INTRAMUSCULAR; INTRAVENOUS at 13:12

## 2020-12-01 NOTE — PROGRESS NOTES
" LOS: 5 days   Patient Care Team:  Provider, No Known as PCP - General    Chief Complaint: ESRD     Subjective    Plan for HD tomorrow.      Subjective    History taken from: patient    Objective     Vital Sign Min/Max for last 24 hours  Temp  Min: 97.2 °F (36.2 °C)  Max: 98.1 °F (36.7 °C)   BP  Min: 106/62  Max: 197/93   Pulse  Min: 59  Max: 72   Resp  Min: 16  Max: 20   SpO2  Min: 96 %  Max: 100 %   No data recorded   No data recorded     Flowsheet Rows      First Filed Value   Admission Height  172.7 cm (68\") Documented at 11/27/2020 0946   Admission Weight  69 kg (152 lb 1.9 oz) Documented at 11/27/2020 0700          No intake/output data recorded.  I/O last 3 completed shifts:  In: 360 [P.O.:360]  Out: -     Objective:  General Appearance:  Comfortable.    Vital signs: (most recent): Blood pressure 172/82, pulse 61, temperature 98.1 °F (36.7 °C), temperature source Oral, resp. rate 18, height 172.7 cm (68\"), weight 69 kg (152 lb 1.9 oz), SpO2 99 %.    Output: No urine output.    HEENT: Normal HEENT exam.    Lungs:  Normal effort and normal respiratory rate.  Breath sounds clear to auscultation.    Heart: Normal rate.  Regular rhythm.  S1 normal and S2 normal.    Abdomen: Abdomen is soft.  Bowel sounds are normal.     Extremities: Normal range of motion.  There is no deformity or dependent edema.    Neurological: Patient is alert and oriented to person, place and time.  Normal strength.    Pupils:  Pupils are equal, round, and reactive to light.              Results Review:     I reviewed the patient's new clinical results.    WBC WBC   Date Value Ref Range Status   11/29/2020 5.69 3.40 - 10.80 10*3/mm3 Final      HGB Hemoglobin   Date Value Ref Range Status   11/29/2020 9.8 (L) 13.0 - 17.7 g/dL Final      HCT Hematocrit   Date Value Ref Range Status   11/29/2020 29.8 (L) 37.5 - 51.0 % Final      Platlets No results found for: LABPLAT   MCV MCV   Date Value Ref Range Status   11/29/2020 102.4 (H) 79.0 - 97.0 fL " Final          Sodium Sodium   Date Value Ref Range Status   11/29/2020 137 136 - 145 mmol/L Final      Potassium Potassium   Date Value Ref Range Status   11/29/2020 4.0 3.5 - 5.2 mmol/L Final      Chloride Chloride   Date Value Ref Range Status   11/29/2020 99 98 - 107 mmol/L Final      CO2 CO2   Date Value Ref Range Status   11/29/2020 23.0 22.0 - 29.0 mmol/L Final      BUN BUN   Date Value Ref Range Status   11/29/2020 38 (H) 6 - 20 mg/dL Final      Creatinine Creatinine   Date Value Ref Range Status   11/29/2020 10.02 (H) 0.76 - 1.27 mg/dL Final      Calcium Calcium   Date Value Ref Range Status   11/29/2020 8.8 8.6 - 10.5 mg/dL Final      PO4 No results found for: CAPO4   Albumin No results found for: ALBUMIN   Magnesium Magnesium   Date Value Ref Range Status   11/29/2020 2.3 1.6 - 2.6 mg/dL Final      Uric Acid No results found for: URICACID     Medication Review: Yes    Assessment/Plan       Acute CVA (cerebrovascular accident) (CMS/[facility])    Blind hypertensive eye, bilateral    DM (diabetes mellitus) (CMS/[facility])    ESRD (end stage renal disease) (CMS/[facility])    HTN (hypertension)      Assessment & Plan     - ESRD: On [facility] jonh. [address]     Anemia: Resume ROMAINE now.      Volume status: optimization with HD      CVA: Left sided weakness w expressive aphasia. MCA infarct   . TPA 11/27/20     HTN: Goal bp <180/90 per the neurology service.  Started on Amlodipine 5 mg.      Hypercalcemia. Low ca bath w HD    - Recs  HD per [facility] jonh.   Will increase increase BFR @ 300-350ml/min   ROMAINE on HD days  Titrate antiHTN meds to target bp<160/80       Aly Oseguera MD  12/01/20  13:53 EST

## 2020-12-01 NOTE — PROGRESS NOTES
Brief cardiology note  -JUVENCIO revealed a severely dilated left and right atrium, appendage without thrombus, bubble study positive for evidence of a PFO  -Likely mechanism of stroke from my standpoint is undiagnosed atrial fibrillation  -Patient sent home on a 30-day telemetry monitor (of note, had a 14 beat run of an irregular SVT yesterday evening around 7:09 PM)  -Recommend full anticoagulation for secondary stroke prophylaxis  -Follow-up in the cardiology clinic in 6 weeks with a   -Findings discussed with the hospitalist team/Dr. Cm, the patient through a  at bedside, and the patient's brother, Hudson    Ruddy Simon MD, MSc, FACC, [facility]  Interventional Cardiology  [facility]

## 2020-12-01 NOTE — PROGRESS NOTES
Stroke Progress Note       Chief Complaint:  aphasia    Subjective    Subjective     Subjective:  Patient is sitting up in chair, no family at bedside.  Visit was conducted through  via phone.  Patient says his right side is much improved, and he also tells me today he believes his speech and word finding issues are much better.  He is n.p.o. for JUVENCIO to be performed today.      Review of Systems   Constitutional: Negative.    HENT: Positive for trouble swallowing.         Pockets food   Respiratory: Negative.    Cardiovascular: Negative.    Skin: Negative.    Psychiatric/Behavioral: Negative.             Objective    Objective      Temp:  [97.2 °F (36.2 °C)-98 °F (36.7 °C)] 98 °F (36.7 °C)  Heart Rate:  [59-75] 64  Resp:  [16-18] 16  BP: (106-185)/() 140/79        Neurological Exam  Mental Status  Awake and alert. Oriented only to place, time and situation. Orientation: He is able to accurately give me month and day today.. Speech is normal. Language: Via  he is able to repeat sentence.    Cranial Nerves  CN II: Blind at baseline.  CN VII:  Right: There is central facial weakness.  CN VIII: Hearing appears intact.  CN XI: Shoulder shrug strength is normal.  CN XII: Tongue midline without atrophy or fasciculations.    Motor    Strength appears basically equal in bilateral upper and lower extremities.    Coordination  Right: Finger-to-nose normal.  Left: Finger-to-nose normal.    Gait  Did not observe.      Physical Exam  Vitals signs and nursing note reviewed.   Constitutional:       General: He is awake.   HENT:      Head: Normocephalic.      Mouth/Throat:      Mouth: Mucous membranes are dry.   Cardiovascular:      Rate and Rhythm: Normal rate.   Pulmonary:      Effort: Pulmonary effort is normal.   Skin:     General: Skin is warm and dry.   Neurological:      Mental Status: He is alert.   Psychiatric:         Mood and Affect: Mood normal.         Speech: Speech normal.         Results  Review:    I reviewed the patient's new clinical results.     MRI brain shows a primarily cortical involvement with acute infarct in the left MCA territory.      TTE EF 45%, saline test are negative, normal left atrial size.  Hemoglobin A1c 6.00, LDL 27  JUVENCIO pending    Assessment/Plan     Assessment/Plan:          60-year-old  man who did not take any medication at home.  He is a non-smoker.  Status post TPA and thrombectomy for left MCA stroke.      1.Left middle cerebral artery stroke             -s/p rtPA, s/p MT, etiology large vessel vs cardioembolic               -MRI brain w/o contrast revealed a primarily cortical involvement with acute infarct in the left MCA territory, without evidence of associated hemorrhage              -Echo saline test negative but final report  continues to be pending.  Scheduled for JUVENCIO today, reviewed procedure and patient is still willing to proceed.  If JUVENCIO is negative patient will need a 30-day event monitor at discharge.                -A1C 6.0, LDL 27     1b. Stroke secondary prevention-               -continue aspirin and plavix for now, will reassess after JUVENCIO.     1c. Stroke recovery-               PT OT to continue treatment plan        2 Hypertension-              normalize blood pressure goals     3 Type 2 diabetes-hemoglobin A1c 6.0.no diabetes education needed.       4 Hyperlipidemia-LDL 27, does not need high-dose statin, will decrease to 40 mg daily.         All information relayed to patient via .  All questions regarding JUVENCIO were answered, patient is aware that the procedure will not correct his deficits from the stroke, but will help us determine the etiology of his stroke, therefore possibly preventing future strokes.  Final recommendations pending results of JUVENCIO.       Hemalatha Young, HUSEYIN   12/01/20  10:24 EST

## 2020-12-01 NOTE — PROGRESS NOTES
"                  Clinical Nutrition     Reason for Visit:   Follow-up protocol    Patient Name: Nain Matthews  YOB: 1960  MRN: [id number]  Date of Encounter: 12/01/20 08:18 EST  Admission date: 11/26/2020      Nutrition Assessment   Assessment     Admission diagnosis  Acute CVA    Additional diagnosis/conditions/procedures this admission  Expressive aphasia  (11/27) SLP eval, rec - soft textures, whole, thin liquids  (11/28) SLP eval, rec - soft textures, whole, thin liquids  (11/30) s/p JUVENCIO    PMH/procedures:  HTN  DM2  ESRD    Reported/Observed/Food/Nutrition Related History:      NPO today for JUVENCIO. Patient tolerating soft texture diet prior to NPO. Per PO documentation, patient consuming 75 - 100% of past several meals.      Anthropometrics     Height: 172.7 cm (68\")  Last filed wt: Weight: 69 kg (152 lb 1.9 oz) (11/28/20 0530)  Weight Method: Bed scale    BMI: BMI (Calculated): 23.1  Normal: 18.5-24.9kg/m2    Ideal Body Weight (IBW) (kg): 70.89  Admission wt: 152 lb 1.9 oz  Method obtained: bed scale weight per charting 11/27    Labs reviewed     Results from last 7 days   Lab Units 11/29/20  0555 11/28/20  0406 11/27/20  0443   GLUCOSE mg/dL 61* 77 98   BUN mg/dL 38* 26* 41*   CREATININE mg/dL 10.02* 7.26* 9.12*   SODIUM mmol/L 137 140 139   CHLORIDE mmol/L 99 103 97*   POTASSIUM mmol/L 4.0 4.1 3.6   PHOSPHORUS mg/dL 5.1* 5.1* 5.5*   MAGNESIUM mg/dL 2.3 2.2 2.2   ALT (SGPT) U/L  --   --  10     Results from last 7 days   Lab Units 11/27/20  0443   ALBUMIN g/dL 3.30*   CHOLESTEROL mg/dL 76   TRIGLYCERIDES mg/dL 65       Results from last 7 days   Lab Units 12/01/20  0755 11/30/20  2052 11/30/20  1701 11/30/20  0923 11/30/20  0743 11/30/20  0501   GLUCOSE mg/dL 84 136* 141* 81 72 73     Lab Results   Lab Value Date/Time    HGBA1C 6.00 (H) 11/27/2020 0443       Medications reviewed   Pertinent: insulin      Intake/Output 24 hrs (7:00AM - 6:59 AM)     Intake & Output (last day)       11/30 0701 - " 12/01 0700 12/01 0701 - 12/02 0700    P.O. 360     Total Intake(mL/kg) 360 (5.2)     Net +360           Urine Unmeasured Occurrence 2 x     Stool Unmeasured Occurrence 3 x           Current Nutrition Prescription     PO: NPO Diet for procedure  Intake: prior to NPO, 75% x 5 meals      Nutrition Diagnosis     12/1  Problem No nutrition diagnosis at this time   Etiology    Signs/Symptoms        Nutrition Intervention     1.  Follow treatment progress, Care plan reviewed    Goal:   General: Nutrition support treatment  PO: Continue positive trend when diet reinstated       Monitoring/Evaluation:   Per protocol, PO intake, Pertinent labs, Symptoms    Will Continue to follow per protocol    Megan Pickard, DILCIAN, LD  Time Spent: 25 minutes

## 2020-12-01 NOTE — THERAPY TREATMENT NOTE
Patient Name: Nain Matthews  : 1960    MRN: [id number]                              Today's Date: 2020       Admit Date: 2020    Visit Dx:     ICD-10-CM ICD-9-CM   1. Acute CVA (cerebrovascular accident) (CMS/[facility])  I63.9 434.91   2. Oral phase dysphagia  R13.11 787.21   3. Aphasia  R47.01 784.3   4. Blind hypertensive eye, bilateral  H35.033 360.42   5. ESRD (end stage renal disease) (CMS/[facility])  N18.6 585.6     Patient Active Problem List   Diagnosis   • Acute CVA (cerebrovascular accident) (CMS/[facility])   • CVA (cerebral vascular accident) (CMS/[facility])   • Blind hypertensive eye, bilateral   • DM (diabetes mellitus) (CMS/[facility])   • ESRD (end stage renal disease) (CMS/[facility])   • HTN (hypertension)     History reviewed. No pertinent past medical history.  History reviewed. No pertinent surgical history.  General Information     Row Name 20 1021          Physical Therapy Time and Intention    Document Type  therapy note (daily note)  -KM     Mode of Treatment  physical therapy  -     Row Name 20 1021          General Information    Patient Profile Reviewed  yes  -KM     Existing Precautions/Restrictions  fall;other (see comments) blind, speaks Uzbek,  machine in room  -KM     Barriers to Rehab  cognitive status;visual deficit;language barrier  -     Row Name 20 1021          Cognition    Orientation Status (Cognition)  unable/difficult to assess  -     Row Name 20 1021          Safety Issues, Functional Mobility    Impairments Affecting Function (Mobility)  balance;cognition;endurance/activity tolerance;strength;sensation/sensory awareness;postural/trunk control  -     Cognitive Impairments, Mobility Safety/Performance  impulsivity;other (see comments) apahsia  -       User Key  (r) = Recorded By, (t) = Taken By, (c) = Cosigned By    Initials Name Provider Type     Jennie Bryson, PT Physical Therapist        Mobility     Row Name 20 1024          Bed  Mobility    Bed Mobility  supine-sit  -KM     Scooting/Bridging [place]tinger (Bed Mobility)  verbal cues;standby assist  -KM     Supine-Sit [place] (Bed Mobility)  verbal cues;nonverbal cues (demo/gesture);standby assist  -KM     Assistive Device (Bed Mobility)  bed rails  -KM     Row Name 12/01/20 1024          Sit-Stand Transfer    Sit-Stand [place] (Transfers)  contact guard  -KM     Assistive Device (Sit-Stand Transfers)  walker, front-wheeled  -KM     Row Name 12/01/20 1024          Gait/Stairs (Locomotion)    [place] Level (Gait)  contact guard  -KM     Assistive Device (Gait)  walker, front-wheeled  -KM     Distance in Feet (Gait)  45  -KM     Deviations/Abnormal Patterns (Gait)  gait speed decreased  -KM       User Key  (r) = Recorded By, (t) = Taken By, (c) = Cosigned By    Initials Name Provider Type    Jennie Rachel, PT Physical Therapist        Obj/Interventions     Row Name 12/01/20 1025          Motor Skills    Therapeutic Exercise  knee;ankle;hip  -KM     Row Name 12/01/20 1025          Hip (Therapeutic Exercise)    Hip (Therapeutic Exercise)  AROM (active range of motion);strengthening exercise  -KM     Hip AROM (Therapeutic Exercise)  bilateral;aBduction;aDduction;10 repetitions  -KM     Hip Strengthening (Therapeutic Exercise)  bilateral;marching while seated;10 repetitions  -KM     Row Name 12/01/20 1025          Knee (Therapeutic Exercise)    Knee (Therapeutic Exercise)  AROM (active range of motion)  -KM     Knee AROM (Therapeutic Exercise)  bilateral;LAQ (long arc quad);sitting;10 repetitions  -KM     Row Name 12/01/20 1025          Ankle (Therapeutic Exercise)    Ankle (Therapeutic Exercise)  AROM (active range of motion)  -KM     Ankle AROM (Therapeutic Exercise)  bilateral;dorsiflexion;plantarflexion;10 repetitions  -KM     Row Name 12/01/20 1025          Balance    Static Sitting Balance  WFL;sitting, edge of bed  -KM     Static Standing Balance  WFL;supported   -KM       User Key  (r) = Recorded By, (t) = Taken By, (c) = Cosigned By    Initials Name Provider Type    Jennie Rachel, PT Physical Therapist        Goals/Plan    No documentation.       Clinical Impression     Row Name 12/01/20 1026          Pain Scale: Numbers Pre/Post-Treatment    Pretreatment Pain Rating  0/10 - no pain  -KM     Posttreatment Pain Rating  0/10 - no pain  -KM     Row Name 12/01/20 1026          Plan of Care Review    Plan of Care Reviewed With  patient  -KM     Outcome Summary  Patient transfers to eob with standby assist , transfers with c.g.assist and ambul with r wx and c.g.assist 45 ft. Patient requires assist for safety due to impulsivity. Continue to progress gait distance  -KM     Row Name 12/01/20 1026          Positioning and Restraints    Pre-Treatment Position  in bed  -KM     Post Treatment Position  chair  -KM     In Chair  reclined;call light within reach;encouraged to call for assist;exit alarm on  -KM       User Key  (r) = Recorded By, (t) = Taken By, (c) = Cosigned By    Initials Name Provider Type    Jennie Rachel, PT Physical Therapist        Outcome Measures     Row Name 12/01/20 1030          How much help from another person do you currently need...    Turning from your back to your side while in flat bed without using bedrails?  4  -KM     Moving from lying on back to sitting on the side of a flat bed without bedrails?  4  -KM     Moving to and from a bed to a chair (including a wheelchair)?  3  -KM     Standing up from a chair using your arms (e.g., wheelchair, bedside chair)?  3  -KM     Climbing 3-5 steps with a railing?  3  -KM     To walk in hospital room?  3  -KM     AM-PAC 6 Clicks Score (PT)  20  -KM     Row Name 12/01/20 1030          Functional Assessment    Outcome Measure Options  AM-PAC 6 Clicks Basic Mobility (PT)  -KM       User Key  (r) = Recorded By, (t) = Taken By, (c) = Cosigned By    Initials Name Provider Type    CECIL Bryson  Jennie RODRIGUES, PT Physical Therapist        Physical Therapy Education                 Title: PT OT SLP Therapies (In Progress)     Topic: Physical Therapy (Done)     Point: Mobility training (Done)     Learning Progress Summary           Patient Acceptance, E,D, DU by  at 12/1/2020 1031    Acceptance, E,TB, VU,NR by AY at 11/27/2020 1533                   Point: Home exercise program (Done)     Learning Progress Summary           Patient Acceptance, E,D, DU by KM at 12/1/2020 1031                   Point: Body mechanics (Done)     Learning Progress Summary           Patient Acceptance, E,D, DU by KM at 12/1/2020 1031    Acceptance, E,TB, VU,NR by AY at 11/27/2020 1533                   Point: Precautions (Done)     Learning Progress Summary           Patient Acceptance, E,D, DU by  at 12/1/2020 1031    Acceptance, E,TB, VU,NR by AY at 11/27/2020 1533                               User Key     Initials Effective Dates Name Provider Type Discipline     06/19/15 -  Jennie Bryson, PT Physical Therapist PT     11/10/20 -  Tereza Shirley, JASON Physical Therapist PT              PT Recommendation and Plan     Plan of Care Reviewed With: patient  Outcome Summary: Patient transfers to eob with standby assist , transfers with c.g.assist and ambul with r wx and c.g.assist 45 ft. Patient requires assist for safety due to impulsivity. Continue to progress gait distance     Time Calculation:   PT Charges     Row Name 12/01/20 1033             Time Calculation    Start Time  0854  -KM      PT Received On  12/01/20  -KM      PT Goal Re-Cert Due Date  12/07/20  -KM         Time Calculation- PT    Total Timed Code Minutes- PT  23 minute(s)  -KM         Timed Charges    04390 - PT Therapeutic Exercise Minutes  12  -KM      05521 - Gait Training Minutes   11  -KM        User Key  (r) = Recorded By, (t) = Taken By, (c) = Cosigned By    Initials Name Provider Type    Jennie Rachel, PT Physical Therapist         Therapy Charges for Today     Code Description Service Date Service Provider Modifiers Qty    58913049829 HC PT THER PROC EA 15 MIN 12/1/2020 Jennie Bryson, PT GP 1    [id number] HC GAIT TRAINING EA 15 MIN 12/1/2020 Jennie Bryson, PT GP 1          PT G-Codes  Outcome Measure Options: AM-PAC 6 Clicks Basic Mobility (PT)  AM-PAC 6 Clicks Score (PT): 20  AM-PAC 6 Clicks Score (OT): 17  Modified [place] Scale: 3 - Moderate disability.  Requiring some help, but able to walk without assistance.    Jennie Bryson, PT  12/1/2020

## 2020-12-01 NOTE — PROGRESS NOTES
Continued Stay Note  [facility]     Patient Name: Nain Matthews  MRN: [id number]  Today's Date: 12/1/2020    Admit Date: 11/26/2020    Discharge Plan     Row Name 12/01/20 0805       Plan    Plan  Home with brother    Plan Comments  Outpatient orders for speech and physical therapy placed into [facility] system for outpatient rehab. I will send discharge information to his dialysis center per their request.    Final Discharge Disposition Code  01 - home or self-care        Discharge Codes    No documentation.             Shyanne Early RN

## 2020-12-01 NOTE — NURSING NOTE
Received report on pt via phone> Pt returned to room via WC. NO s/s of distress. Pt remains confused. Family at bedside does not speak english.

## 2020-12-01 NOTE — DISCHARGE SUMMARY
[facility] Medicine Services  DISCHARGE SUMMARY    Patient Name: Nain Matthews  : 1960  MRN: [id number]    Date of Admission: 2020  4:04 PM  Date of Discharge:  2020  Primary Care Physician: Martha, No Known    Consults     Date and Time Order Name Status Description    2020 Inpatient Nephrology Consult      2020 Inpatient Neurology Consult Stroke Completed           Hospital Course     Presenting Problem:   CVA (cerebral vascular accident) (CMS/[facility]) [I63.9]    Active Hospital Problems    Diagnosis  POA   • **Acute CVA (cerebrovascular accident) (CMS/[facility]) [I63.9]  Yes   • Blind hypertensive eye, bilateral [H35.033]  Yes   • DM (diabetes mellitus) (CMS/[facility]) [E11.9]  Yes   • ESRD (end stage renal disease) (CMS/[facility]) [N18.6]  Yes   • HTN (hypertension) [I10]  Yes      Resolved Hospital Problems   No resolved problems to display.          Hospital Course:  Nain Matthews is a 60 y.o. male Syriac-speaking with a history of blindness,  diabetes and ESRD on HD who was transferred to [facility] on 20 from [facility] for stroke. He was found to have a left MCA stroke. He was given tPA and transferred to [facility] for Neurointervention. He was taken to the cath lab by Dr. Haynes. However, mechanical thrombectomy was not successful. He was admitted to the ICU after the procedure. Pt transferred to the floor on .     L MCA Stroke  - s/p tPA and failed mechanical thrombectomy. Etiology likely cardioembolic  - MRI brain  with acute infarct L MCA territory without evidence of hemorrhage.  -JUVENCIO completed today with finding of small PFO and enlarged left antrium concerning for underlying A fib. Placed on renally dosed eliquis. Will continue with 30 day event monitor and follow up with cardiology in 6 weeks to review results. +/- loop recorder at that time  - Continue ASA/ high intensity statin   - Planning home with outpatient PT/OT/SLP    HTN  -On No home  meds  -started Norvasc 10mg daily, Hydralazine 25mg TID. Close follow up with PCP for BP monitoring      DM2  -Hga1c 6.0  -Glucose WNL, continue carb consistent diet at dc     HLD  -High intensity statin      ESRD on HD  -Nephrology following, HD on MWF  -ROMAINE on HD days      Discharge Follow Up Recommendations for outpatient labs/diagnostics:  Follow up with PCP this coming week. BP check  Continue HD MWF, follow up with renal as scheduled  Neurology follow up, Dr. Hunter ~ 4 weeks  Continue 30day cardiac monitor and follow up with Dr. Simon in 6 weeks.      Day of Discharge     HPI: No complaints per patient. Underwent JUVENCIO this afternoon and now eating. Walking well. No pain, soa, dizziness, cp, and wants to go home    Review of Systems  Gen- No fevers, chills  CV- No chest pain, palpitations  Resp- No cough, dyspnea  GI- No N/V/D, abd pain    Vital Signs:   Temp:  [97.2 °F (36.2 °C)-98.1 °F (36.7 °C)] 98.1 °F (36.7 °C)  Heart Rate:  [59-72] 63  Resp:  [16-20] 18  BP: (106-197)/() 158/91     Physical Exam:  Constitutional: No acute distress, awake, alert  HENT: NCAT, mucous membranes moist  Respiratory: Clear to auscultation bilaterally, respiratory effort normal   Cardiovascular: RRR, no murmurs, rubs, or gallops, palpable pedal pulses bilaterally  Gastrointestinal: Positive bowel sounds, soft, nontender, nondistended  Musculoskeletal: No bilateral ankle edema  Psychiatric: Appropriate affect, cooperative  Neurologic: Oriented x 3, strength symmetric in all extremities, Cranial Nerves grossly intact to confrontation, speech clear, Liberian speaking  Skin: No rashes      Pertinent  and/or Most Recent Results     Results from last 7 days   Lab Units 11/29/20  0555 11/28/20  0406 11/27/20  0443   WBC 10*3/mm3 5.69 5.32 6.67   HEMOGLOBIN g/dL 9.8* 10.2* 9.7*   HEMATOCRIT % 29.8* 31.1* 29.5*   PLATELETS 10*3/mm3 99* 92* 101*   SODIUM mmol/L 137 140 139   POTASSIUM mmol/L 4.0 4.1 3.6   CHLORIDE mmol/L 99 103 97*   CO2  mmol/L 23.0 24.0 29.0   BUN mg/dL 38* 26* 41*   CREATININE mg/dL 10.02* 7.26* 9.12*   GLUCOSE mg/dL 61* 77 98   CALCIUM mg/dL 8.8 9.1 8.7     Results from last 7 days   Lab Units 11/27/20  0443   BILIRUBIN mg/dL 0.6   ALK PHOS U/L 99   ALT (SGPT) U/L 10   AST (SGOT) U/L 7     Results from last 7 days   Lab Units 11/27/20  0443   CHOLESTEROL mg/dL 76   TRIGLYCERIDES mg/dL 65   HDL CHOL mg/dL 34*   LDL CHOL mg/dL 27     Results from last 7 days   Lab Units 11/27/20  0443   HEMOGLOBIN A1C % 6.00*       Brief Urine Lab Results     None          Microbiology Results Abnormal     Procedure Component Value - Date/Time    COVID PRE-OP / PRE-PROCEDURE SCREENING ORDER (NO ISOLATION) - Swab, Nasopharynx [[id number]]  (Normal) Collected: 11/26/20 2126    Lab Status: Final result Specimen: Swab from Nasopharynx Updated: 11/26/20 2216    Narrative:      The following orders were created for panel order COVID PRE-OP / PRE-PROCEDURE SCREENING ORDER (NO ISOLATION) - Swab, Nasopharynx.  Procedure                               Abnormality         Status                     ---------                               -----------         ------                     COVID-19,CEPHEID,DORYS IN-...[[id number]]  Normal              Final result                 Please view results for these tests on the individual orders.    COVID-19,CEPHEID,DORYS IN-HOUSE(OR EMERGENT/ADD-ON),NP SWAB IN TRANSPORT MEDIA 3-4 HR TAT - Swab, Nasopharynx [[id number]]  (Normal) Collected: 11/26/20 2126    Lab Status: Final result Specimen: Swab from Nasopharynx Updated: 11/26/20 2216     COVID19 Not Detected    Narrative:      Fact sheet for providers: https://www.fda.gov/media/879790/download     Fact sheet for patients: https://www.fda.gov/media/857432/download          Imaging Results (All)     Procedure Component Value Units Date/Time    MRI Brain Without Contrast [[id number]] Collected: 11/28/20 1817     Updated: 11/28/20 1855    Narrative:      EXAMINATION: MRI BRAIN WO  CONTRAST-      INDICATION: post tpa and thrombectomy; I63.9-Cerebral infarction,  unspecified; R13.11-Dysphagia, oral phase; R47.01-Aphasia     TECHNIQUE: Multiplanar multisequence MRI of the brain performed without  IV contrast     COMPARISON: CT head 1 day prior     FINDINGS: There are diffuse areas of cortical restricted diffusion  throughout the left MCA territory, with predominant frontal, parietal  and temporal lobe involvement, compatible with acute left MCA territory  infarct. No associated hemorrhage. Moderate local edema with sulcal  effacement, without global mass effect or midline shift. There is  moderate volume loss with ex vacuo prominence of the ventricles, without  evidence of hydrocephalus. There is no evidence of intracranial mass,  mass effect or hemorrhage otherwise. The orbits are normal and the  paranasal sinuses are grossly clear. Artifact from left retina surgery  noted.       Impression:      There is a primarily cortical involvement with acute infarct  in the left MCA territory, without evidence of associated hemorrhage.  Mild sulcal effacement, without global mass effect or midline shift.        This report was finalized on 11/28/2020 6:52 PM by Tad Dela Cruz.       CT Head Without Contrast [[id number]] Collected: 11/27/20 1753     Updated: 11/27/20 1800    Narrative:      EXAMINATION: CT HEAD WO CONTRAST-      INDICATION: Stroke, follow up; R13.11-Dysphagia, oral phase     TECHNIQUE: Axial noncontrast CT of the head with multiplanar  reconstruction     The radiation dose reduction device was turned on for each scan per the  ALARA (As Low as Reasonably Achievable) protocol.     COMPARISON: NONE     FINDINGS: Subtle area of transcortical hypoattenuation in the left  frontal operculum, possibly evolving acute infarct. There is otherwise  no evidence of intracranial hemorrhage, mass or mass effect. The  ventricles are normal in size and configuration. The orbits are normal  and the  paranasal sinuses are grossly clear. The calvarium is intact.       Impression:      Subtle area of transcortical hypoattenuation in the left  frontal operculum, possibly evolving acute infarct. There is otherwise  no evidence of intracranial hemorrhage, mass or mass effect.         This report was finalized on 11/27/2020 5:57 PM by Tad Dela Cruz.                       Results for orders placed during the hospital encounter of 11/26/20   Adult Transthoracic Echo Complete W/ Cont if Necessary Per Protocol (With Agitated Saline)    Narrative · Left ventricular systolic function is mildly decreased. Left ventricular   ejection fraction appears to be 45%.  · Left ventricular wall thickness is consistent with mild concentric   hypertrophy.  · Left ventricular diastolic function is consistent with (grade III w/high   LAP) reversible restrictive pattern.  · Mildly reduced right ventricular systolic function noted.  · Left atrial volume is severely increased.  · Mild to moderate aortic valve regurgitation is present.  · Mild mitral annular calcification is present.  · Mild mitral valve regurgitation is present.  · Mild tricuspid valve regurgitation is present.  · Estimated right ventricular systolic pressure from tricuspid   regurgitation is moderately elevated (45-55 mmHg).  · Mild dilation of the aortic root is present.  · There is a left pleural effusion.          Plan for Follow-up of Pending Labs/Results:     Discharge Details        Discharge Medications      New Medications      Instructions Start Date   amLODIPine 10 MG tablet  Commonly known as: NORVASC   10 mg, Oral, Every 24 Hours Scheduled   Start Date: December 2, 2020     apixaban 2.5 MG tablet tablet  Commonly known as: ELIQUIS   2.5 mg, Oral, Every 12 Hours Scheduled      aspirin 81 MG chewable tablet   81 mg, Oral, Daily   Start Date: December 2, 2020     atorvastatin 40 MG tablet  Commonly known as: LIPITOR   40 mg, Oral, Nightly      hydrALAZINE 25 MG  tablet  Commonly known as: APRESOLINE   25 mg, Oral, Every 8 Hours Scheduled             Allergies   Allergen Reactions   • Levofloxacin Unknown - Low Severity         Discharge Disposition:  Home or Self Care    Diet:  Hospital:  Diet Order   Procedures   • NPO Diet       Activity:  Activity Instructions     Activity as Tolerated            Restrictions or Other Recommendations:         CODE STATUS:    Code Status and Medical Interventions:   Ordered at: 11/26/20 1746     Code Status:    CPR     Medical Interventions (Level of Support Prior to Arrest):    Full       No future appointments.    Additional Instructions for the Follow-ups that You Need to Schedule     Ambulatory Referral to Physical Therapy   As directed      Ambulatory Referral to Speech Therapy   As directed      Patient is Pashto speaking only    Order Comments: Patient is Pashto speaking only          Discharge Follow-up with PCP   As directed       Currently Documented PCP:    Provider, No Known    PCP Phone Number:    None     Follow Up Details: 1 week- bp check         Discharge Follow-up with Specified Provider: Dr. Simon; 6 Weeks   As directed      To: Dr. Simon    Follow Up: 6 Weeks         Discharge Follow-up with Specified Provider: [facility]- Dr. Hunter; 1 Month   As directed      To: [facility]- Dr. Hunter    Follow Up: 1 Month                     HUSEYIN Khalil  12/01/20      Time Spent on Discharge:  I spent  60  minutes on this discharge activity which included: face-to-face encounter with the patient, reviewing the data in the system, coordination of the care with the nursing staff as well as consultants, documentation, and entering orders.        Electronically signed by HUSEYIN Khalil, 12/01/20, 3:26 PM EST.

## 2020-12-01 NOTE — THERAPY TREATMENT NOTE
Acute Care - Speech Language Pathology Treatment Note  [facility]     Patient Name: Nain Matthews  : 1960  MRN: [id number]  Today's Date: 2020               Admit Date: 2020     Visit Dx:    ICD-10-CM ICD-9-CM   1. Acute CVA (cerebrovascular accident) (CMS/[facility])  I63.9 434.91   2. Oral phase dysphagia  R13.11 787.21   3. Aphasia  R47.01 784.3   4. Blind hypertensive eye, bilateral  H35.033 360.42   5. ESRD (end stage renal disease) (CMS/[facility])  N18.6 585.6     Patient Active Problem List   Diagnosis   • Acute CVA (cerebrovascular accident) (CMS/[facility])   • CVA (cerebral vascular accident) (CMS/[facility])   • Blind hypertensive eye, bilateral   • DM (diabetes mellitus) (CMS/[facility])   • ESRD (end stage renal disease) (CMS/[facility])   • HTN (hypertension)     History reviewed. No pertinent past medical history.  History reviewed. No pertinent surgical history.     SLP EVALUATION (last 72 hours)      SLP SLC Evaluation     Row Name 20 1030 20 1345                Communication Assessment/Intervention    Document Type  therapy note (daily note)  -  evaluation  -       Subjective Information  no complaints  -  no complaints  -       Patient Observations  alert;cooperative;agree to therapy  -  alert;cooperative  -       Patient/Family/Caregiver Comments/Observations  no family present  -  Brother present.  -       Care Plan Review  care plan/treatment goals reviewed  -  --       Patient Effort  adequate  -  adequate  -       Comment  Patient is Pashto speaking.  utilized  -  --          General Information    Patient Profile Reviewed  --  yes  -AC       Pertinent History Of Current Problem  --  See previous eval.  -       Precautions/Limitations, Vision  --  vision impairment, bilaterally;other (see comments) blind per chart  -       Precautions/Limitations, Hearing  --  WFL;for purposes of eval  -       Prior Level of Function-Communication  --  unknown  -        "Plans/Goals Discussed with  --  patient and family  -       Barriers to Rehab  --  language barrier used Bangladeshi interpretor w/ iPad  -AC       Patient's Goals for Discharge  --  patient did not state  -AC       Family Goals for Discharge  --  family did not state  -AC          Pain    Additional Documentation  Pain Scale: FACES Pre/Post-Treatment (Group)  -  Pain Scale: Numbers Pre/Post-Treatment (Group)  -AC          Pain Scale: Numbers Pre/Post-Treatment    Pretreatment Pain Rating  0/10 - no pain  -CH  0/10 - no pain  -AC       Posttreatment Pain Rating  0/10 - no pain  -CH  0/10 - no pain  -AC          Pain Scale: FACES Pre/Post-Treatment    Pain: FACES Scale, Pretreatment  0-->no hurt  -CH  --       Posttreatment Pain Rating  0-->no hurt  -CH  --          Comprehension Assessment/Intervention    Comprehension Assessment/Intervention  --  Auditory Comprehension  -AC          Auditory Comprehension Assessment/Intervention    Auditory Comprehension (Communication)  --  mild impairment;moderate impairment  -AC       Answers Questions (Communication)  --  mild impairment;moderate impairment;complex;yes/no  -AC       Able to Follow Commands (Communication)  --  mild impairment;moderate impairment;2-step  -AC          Expression Assessment/Intervention    Expression Assessment/Intervention  --  verbal expression  -AC          Verbal Expression Assessment/Intervention    Verbal Expression  --  moderate impairment  -AC       Responsive Naming  --  moderate impairment;simple  -AC       Confrontational Naming  --  moderate impairment;severe impairment;high frequency  -       Spontaneous/Functional Words  --  moderate impairment;simple  -AC       Verbal Expression, Comment  --  Pt agreed that he is having difficulty \"finding the right words.\" Brother indicated that pt has been able to speak in phrases more easily today.  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 Pt's brother indicated that speech/language skills have improved greatly since pt was first admitted. Pt is interested in  SLP services.  -       Rehab Potential/Prognosis  --  good  -       SLC Criteria for Skilled Therapy Interventions Met  --  yes  -       Functional Impact  --  difficulty communicating wants, needs;difficulty communicating in an emergency;restrictions in personal and social life  -       Plan for Continued Treatment (SLP)  Continue to follow to address language and dysphagia in tx. Dysphagia not address this date d/t patient NPO for JUVENCIO.   -  --          Recommendations    Therapy Frequency (SLP SLC)  5 days per week  -  5 days per week  -       Predicted Duration Therapy Intervention (Days)  until discharge  -  until discharge  -AC       Anticipated Discharge Disposition (SLP)  inpatient rehabilitation facility;anticipate therapy at next level of care  -  inpatient rehabilitation facility;anticipate therapy at next level of care  -         User Key  (r) = Recorded By, (t) = Taken By, (c) = Cosigned By    Initials Name Effective Dates     Ivonne Dolan, MS CCC-SLP 07/27/17 -      Rossana Cardoso MS CCC-SLP 08/09/20 -              EDUCATION  The patient has been educated in the following areas:     Cognitive Impairment Communication Impairment.    SLP Recommendation and Plan              Anticipated Discharge Disposition (SLP): inpatient rehabilitation facility, anticipate therapy at next level of  care        Predicted Duration Therapy Intervention (Days): until discharge  Daily Summary of Progress (SLP): progress toward functional goals as expected  Plan for Continued Treatment (SLP): Continue to follow to address language and dysphagia in tx. Dysphagia not address this date d/t patient NPO for JUVENCIO.                     SLP GOALS     Row Name 12/01/20 1030 11/28/20 1400          Oral Nutrition/Hydration Goal 1 (SLP)    Oral Nutrition/Hydration Goal 1, SLP  LTG: Pt will return to regular diet, thin liquids w/o aspiration w/ 100% acc.  -CH  LTG: Pt will return to regular diet, thin liquids w/o aspiration w/ 100% acc.  -AC     Time Frame (Oral Nutrition/Hydration Goal 1, SLP)  by discharge  -CH  by discharge  -AC     Progress/Outcomes (Oral Nutrition/Hydration Goal 1, SLP)  goal ongoing  -CH  goal ongoing  -AC        Oral Nutrition/Hydration Goal 2 (SLP)    Oral Nutrition/Hydration Goal 2, SLP  Pt will demonstrate adequate mastication of regular solids w/o difficulty or residue w/ 100% acc.  -CH  Pt will demonstrate adequate mastication of regular solids w/o difficulty or residue w/ 100% acc.  -AC     Time Frame (Oral Nutrition/Hydration Goal 2, SLP)  short term goal (STG)  -CH  short term goal (STG)  -AC     Barriers (Oral Nutrition/Hydration Goal 2, SLP)  Unable to complete trials d/t NPO for JUVENCIO  -  --     Progress/Outcomes (Oral Nutrition/Hydration Goal 2, SLP)  goal ongoing  -CH  goal ongoing  -AC        Labial Strengthening Goal 1 (SLP)    Activity (Labial Strengthening Goal 1, SLP)  increase labial tone;increase labial sensation/afferent drive  -  --     Increase Labial Tone  labial resistance exercises;swallow trials  -  labial resistance exercises;swallow trials  -AC     Increase Labial Sensation/Afferent Drive  labial resistance exercises;swallow trials;sensory stimulation (CN V, VII)  -CH  labial resistance exercises;swallow trials;sensory stimulation (CN V, VII)  -AC     [place]/Accuracy  (Labial Strengthening Goal 1, SLP)  with minimal cues (75-90% accuracy)  -CH  with minimal cues (75-90% accuracy)  -AC     Time Frame (Labial Strengthening Goal 1, SLP)  short term goal (STG)  -CH  short term goal (STG)  -AC     Progress/Outcomes (Labial Strengthening Goal 1, SLP)  goal ongoing  -CH  goal ongoing  -AC        Lingual Strengthening Goal 1 (SLP)    Activity (Lingual Strengthening Goal 1, SLP)  increase lingual tone/sensation/control/coordination/movement  -  --     Increase Lingual Tone/Sensation/Control/Coordination/Movement  lingual movement exercises;lingual resistance exercises;swallow trials  -  lingual movement exercises;lingual resistance exercises;swallow trials  -AC     [place]/Accuracy (Lingual Strengthening Goal 1, SLP)  with minimal cues (75-90% accuracy)  -CH  with minimal cues (75-90% accuracy)  -AC     Time Frame (Lingual Strengthening Goal 1, SLP)  short term goal (STG)  -CH  short term goal (STG)  -AC     Progress/Outcomes (Lingual Strengthening Goal 1, SLP)  goal ongoing  -  goal ongoing  -AC        Swallow Compensatory Strategies Goal 1 (SLP)    Activity (Swallow Compensatory Strategies/Techniques Goal 1, SLP)  compensatory strategies;during meal intake;during p.o. trials;small bites;small cup sips;small straw sips;alternate food/liquid intake;other (see comments)  -  compensatory strategies;during meal intake;during p.o. trials;small bites;small cup sips;small straw sips;alternate food/liquid intake;other (see comments) check for pocketing & use lingual/finger sweep PRN  -AC     [place]/Accuracy (Swallow Compensatory Strategies/Techniques Goal 1, SLP)  with minimal cues (75-90% accuracy)  -CH  with minimal cues (75-90% accuracy)  -AC     Time Frame (Swallow Compensatory Strategies/Techniques Goal 1, SLP)  short term goal (STG)  -  short term goal (STG)  -AC     Progress/Outcomes (Swallow Compensatory Strategies/Techniques Goal 1, SLP)  goal ongoing  -  --         Comprehend Questions Goal 1 (SLP)    Improve Ability to Comprehend Questions Goal 1 (SLP)  complex yes/no questions;80%;with minimal cues (75-90%)  -CH  complex yes/no questions;80%;with minimal cues (75-90%)  -AC     Time Frame (Comprehend Questions Goal 1, SLP)  short term goal (STG)  -CH  short term goal (STG)  -AC     Progress (Ability to Comprehend Questions Goal 1, SLP)  40%;with moderate cues (50-74%)  -CH  --        Follow Directions Goal 2 (SLP)    Improve Ability to Follow Directions Goal 1 (SLP)  2 step commands;80%;with minimal cues (75-90%)  -CH  2 step commands;80%;with minimal cues (75-90%)  -AC     Time Frame (Follow Directions Goal 1, SLP)  short term goal (STG)  -CH  short term goal (STG)  -AC     Progress (Ability to Follow Directions Goal 1, SLP)  30%;with moderate cues (50-74%)  -CH  --     Comment (Follow Directions Goal 1, SLP)  Utilizing   -  --        Word Retrieval Skills Goal 1 (SLP)    Improve Word Retrieval Skills By Goal 1 (SLP)  responsive naming task;confrontational naming task;high frequency;completing functional word finding tasks;80%;with minimal cues (75-90%)  -CH  responsive naming task;confrontational naming task;high frequency;completing functional word finding tasks;80%;with minimal cues (75-90%)  -AC     Time Frame (Word Retrieval Goal 1, SLP)  short term goal (STG)  -CH  short term goal (STG)  -AC     Progress (Word Retrieval Skills Goal 1, SLP)  40%;with moderate cues (50-74%)  -  --     Comment (Word Retrieval Goal 1, SLP)  confrontational naming targeted  -  --        Additional Goal 1 (SLP)    Additional Goal 1, SLP  LTG: Pt will improve communication skills in order to participate in care w/ 100% acc w/o cues.  -CH  LTG: Pt will improve communication skills in order to participate in care w/ 100% acc w/o cues.  -AC     Time Frame (Additional Goal 1, SLP)  by discharge  -CH  by discharge  -AC     Progress/Outcomes (Additional Goal 1, SLP)  continuing  progress toward goal  -  --       User Key  (r) = Recorded By, (t) = Taken By, (c) = Cosigned By    Initials Name Provider Type    Ivonne Dasilva MS CCC-SLP Speech and Language Pathologist    Rossana Dejesus MS CCC-SLP Speech and Language Pathologist                  Time Calculation:     Time Calculation- SLP     Row Name 12/01/20 1322             Time Calculation- SLP    SLP Start Time  1030  -      SLP Received On  12/01/20  -        User Key  (r) = Recorded By, (t) = Taken By, (c) = Cosigned By    Initials Name Provider Type    Rossana Dejesus MS CCC-SLP Speech and Language Pathologist          Therapy Charges for Today     Code Description Service Date Service Provider Modifiers Qty    [id number]  ST TREATMENT SPEECH 3 12/1/2020 Rossana Cardoso MS CCC-SLP GN 1                     Rossana Cardoso MS CCC-SLP  12/1/2020     Patient was not wearing a face mask and did not exhibit coughing during this therapy encounter.  Procedure performed was aerosolizing, involved close contact (within 6 feet for at least 15 minutes or longer), and did not involve contact with infectious secretions or specimens.  Therapist used appropriate personal protective equipment including gloves, standard procedure mask and eye protection.  Appropriate PPE was worn during the entire therapy session.  Hand hygiene was completed before and after therapy session.

## 2020-12-01 NOTE — PLAN OF CARE
Problem: Adult Inpatient Plan of Care  Goal: Plan of Care Review  Outcome: Ongoing, Progressing  Flowsheets (Taken 12/1/2020 1026 by Jennie Bryson, PT)  Plan of Care Reviewed With: patient   Goal Outcome Evaluation:  Plan of Care Reviewed With: patient  Progress: improving     SLP treatment completed. Will continue to address dysphagia and communication in tx.. Please see note for further details and recommendations.

## 2020-12-01 NOTE — PLAN OF CARE
Goal Outcome Evaluation:  Plan of Care Reviewed With: patient, sibling  Progress: improving   Pt rested comfortably this shift with VSS and no s/s of acute distress. Pt NPO after midnight in preparation for JUVENCIO. Consent signed and in chart. NSR and A&O x2. Safety precautions in place. Will continue to monitor.

## 2020-12-01 NOTE — PLAN OF CARE
Goal Outcome Evaluation:  Plan of Care Reviewed With: patient  Progress: improving  Outcome Summary: Patient transfers to eob with standby assist , transfers with c.g.assist and ambul with r wx and c.g.assist 45 ft. Patient requires assist for safety due to impulsivity. Continue to progress gait distance

## 2020-12-02 ENCOUNTER — READMISSION MANAGEMENT (OUTPATIENT)
Dept: CALL CENTER | Facility: HOSPITAL | Age: 60
End: 2020-12-02

## 2020-12-02 NOTE — OUTREACH NOTE
Prep Survey      Responses   [employer] facility patient discharged from?  [facility]   Is LACE score < 7 ?  No   Eligibility  Readm Mgmt   Discharge diagnosis  **Acute CVA (cerebrovascular accident   Does the patient have one of the following disease processes/diagnoses(primary or secondary)?  Stroke (TIA)   Does the patient have Home health ordered?  No   Is there a DME ordered?  No   Prep survey completed?  Yes          Deanna Ventura RN

## 2020-12-02 NOTE — DISCHARGE PLACEMENT REQUEST
"Linda Matthews (60 y.o. Male) Patient discharged yesterday.     Date of Birth Social Security Number Address Home Phone MRN    1960  [address]  [place] [id number] [phone] [id number]    Nondenominational Marital Status          Anabaptism        Admission Date Admission Type Admitting Provider Attending Provider Department, Room/Bed    20 Urgent Alva Cm, DO  [facility] 3F, S301/1    Discharge Date Discharge Disposition Discharge Destination        2020 Home or Self Care              Attending Provider: (none)   Allergies: Levofloxacin    Isolation: None   Infection: None   Code Status: Prior    Ht: 167.6 cm (65.98\")   Wt: 68.9 kg (152 lb)    Admission Cmt: None   Principal Problem: Acute CVA (cerebrovascular accident) (CMS/[facility]) [I63.9]                 Active Insurance as of 2020     Primary Coverage     Payor Plan Insurance Group Employer/Plan Group    [place]      Payor Plan Address Payor Plan Phone Number Payor Plan Fax Number Effective Dates    [phone]  2020 - None Entered    [place] [id number]       Subscriber Name Subscriber Birth Date Member ID       ILNDA MATTHEWS 1960 [id number]                 Emergency Contacts      (Rel.) Home Phone Work Phone Mobile Phone    Hudson Matthews (Brother) [phone] -- --            Insurance Information                [place] Phone: [phone]    Subscriber: Linda Matthews Subscriber#: [id number]    Group#:  Precert#:         [address]  [address] [place] [id number]  Phone:  [phone]  Fax:  [phone]        Patient:     Linda Matthews MRN:  [id number]   [address]  [facility] [address] :  1960  SSN:    Phone: [phone] Sex:  M      INSURANCE PAYOR PLAN GROUP # SUBSCRIBER ID   Primary[place]    [place]    [id number] " "  Admitting Diagnosis: CVA (cerebral vascular accident) (CMS/[facility]) [I63.9]  Order Date:  Dec 1, 2020         Hemodialysis Inpatient       (Order ID: [id number])     Diagnosis:         Priority:  Routine Expected Date:   Expiration Date:        Interval:  Once Count:    Duration of Treatment: 3.5 Hours  Access Site: AVF  Dialyzer: Revaclear  DFR: 700 mL/min  Dialysate Temperature (C): 36  BFR-As tolerated to a maximum of: 350 mL/min  Dialysate Solution Bath: K+ = 2 mEq, Ca = 2mEq  Bicarb: 33 mEq  Na+: 137 meq  Fluid Removal: 2.5     Specimen Type:   Specimen Source:   Specimen Taken Date:   Specimen Taken Time:                   Authorizing Provider:Aly Oseguera MD  Authorizing Provider's NPI: [id number]  Order Entered By: Aly Oseguera MD 12/1/2020  1:55 PM     Electronically signed by: Aly Oseguera MD 12/1/2020  1:55 PM        Aly Oseguera MD   Physician   Nephrology   Progress Notes   Signed   Date of Service:  12/01/20 1353   Creation Time:  12/01/20 1353            Signed        Expand All Collapse All      Show:Clear all  [x]Manual[x]Template[x]Copied    Added by:  [x]Aly Oseguera MD    []Bebetover for details   LOS: 5 days   Patient Care Team:  Provider, No Known as PCP - General     Chief Complaint: ESRD         Subjective       Plan for HD tomorrow.        Subjective     History taken from: patient        Objective         Vital Sign Min/Max for last 24 hours  Temp  Min: 97.2 °F (36.2 °C)  Max: 98.1 °F (36.7 °C)   BP  Min: 106/62  Max: 197/93   Pulse  Min: 59  Max: 72   Resp  Min: 16  Max: 20   SpO2  Min: 96 %  Max: 100 %   No data recorded   No data recorded          Flowsheet Rows      First Filed Value   Admission Height  172.7 cm (68\") Documented at 11/27/2020 0946   Admission Weight  69 kg (152 lb 1.9 oz) Documented at 11/27/2020 0700             No intake/output data recorded.  I/O last 3 completed shifts:  In: 360 [P.O.:360]  Out: -      Objective:  General Appearance:  Comfortable.    Vital signs: " "(most recent): Blood pressure 172/82, pulse 61, temperature 98.1 °F (36.7 °C), temperature source Oral, resp. rate 18, height 172.7 cm (68\"), weight 69 kg (152 lb 1.9 oz), SpO2 99 %.    Output: No urine output.    HEENT: Normal HEENT exam.    Lungs:  Normal effort and normal respiratory rate.  Breath sounds clear to auscultation.    Heart: Normal rate.  Regular rhythm.  S1 normal and S2 normal.    Abdomen: Abdomen is soft.  Bowel sounds are normal.     Extremities: Normal range of motion.  There is no deformity or dependent edema.    Neurological: Patient is alert and oriented to person, place and time.  Normal strength.    Pupils:  Pupils are equal, round, and reactive to light.                   Results Review:                I reviewed the patient's new clinical results.     WBC       WBC   Date Value Ref Range Status   11/29/2020 5.69 3.40 - 10.80 10*3/mm3 Final       HGB       Hemoglobin   Date Value Ref Range Status   11/29/2020 9.8 (L) 13.0 - 17.7 g/dL Final       HCT Hematocrit   Date Value Ref Range Status   11/29/2020 29.8 (L) 37.5 - 51.0 % Final       Platlets No results found for: LABPLAT   MCV       MCV   Date Value Ref Range Status   11/29/2020 102.4 (H) 79.0 - 97.0 fL Final             Sodium       Sodium   Date Value Ref Range Status   11/29/2020 137 136 - 145 mmol/L Final       Potassium       Potassium   Date Value Ref Range Status   11/29/2020 4.0 3.5 - 5.2 mmol/L Final       Chloride       Chloride   Date Value Ref Range Status   11/29/2020 99 98 - 107 mmol/L Final       CO2       CO2   Date Value Ref Range Status   11/29/2020 23.0 22.0 - 29.0 mmol/L Final       BUN       BUN   Date Value Ref Range Status   11/29/2020 38 (H) 6 - 20 mg/dL Final       Creatinine       Creatinine   Date Value Ref Range Status   11/29/2020 10.02 (H) 0.76 - 1.27 mg/dL Final       Calcium       Calcium   Date Value Ref Range Status   11/29/2020 8.8 8.6 - 10.5 mg/dL Final       PO4 No results found for: CAPO4   Albumin " No results found for: ALBUMIN   Magnesium       Magnesium   Date Value Ref Range Status   11/29/2020 2.3 1.6 - 2.6 mg/dL Final       Uric Acid No results found for: URICACID      Medication Review: Yes        Assessment/Plan           Acute CVA (cerebrovascular accident) (CMS/[facility])    Blind hypertensive eye, bilateral    DM (diabetes mellitus) (CMS/HCC)    ESRD (end stage renal disease) (CMS/[facility])    HTN (hypertension)        Assessment & Plan      - ESRD: On [facility]. [address]     Anemia: Resume ROMAINE now.      Volume status: optimization with HD      CVA: Left sided weakness w expressive aphasia. MCA infarct   . TPA 11/27/20      HTN: Goal bp <180/90 per the neurology service.  Started on Amlodipine 5 mg.      Hypercalcemia. Low ca bath w HD     - Recs  HD per [facility].   Will increase increase BFR @ 300-350ml/min   ROMAINE on HD days  Titrate antiHTN meds to target bp<160/80        Aly Oseguera MD  12/01/20  13:53 EST                                  History & Physical      Aric Yao MD at 11/26/20 1728          INTENSIVIST   HOSPITAL VISIT NOTE     Hospital:  LOS: 0 days     Subjective   S     Mr. Nain Matthews, 60 y.o. male is followed for:      Acute CVA (cerebrovascular accident) (CMS/[facility])    DM (diabetes mellitus)    ESRD    HTN    As an Intensivist, we provide an integrated approach to the ICU patient and family, medical management of comorbid conditions, including but not limited to electrolytes, glycemic control, organ dysfunction, lead interdisciplinary rounds and coordinate the care with all other services, including those from other specialists.     HPI:  Nain Matthews is a 60 y.o. male, who presents to [facility] on 11/26/2020 4 right facial droop and slurred speech.  Patient was initially evaluated in the emergency department at [facility] at 1329 today.  He presented with facial droop and slurred speech and his initial NIH stroke scale: 4.  He was found to have an MCA stroke and he received TPA  at that facility. His condition continued to deteriorate and postinfusion he was not able to follow commands.  At that point NIH stroke scale was 16.  He was transferred to [facility] for elevated level of care.  Patient was taken directly to the Cath Lab for emergent thrombectomy. Covid-19 results were obtained at [place] and were reportedly negative.    His past medical history is significant for HTN, DM, blindness, and end-stage renal disease s/p left arm fistula for dialysis.        PMH: He  has no past medical history on file.   PSxH: He  has no past surgical history on file.     Medications:  No current facility-administered medications on file prior to encounter.      No current outpatient medications on file prior to encounter.     Allergies: He is allergic to levofloxacin.   FH: His family history is not on file.   SH: He  reports that he has never smoked. He does not have any smokeless tobacco history on file. He reports previous alcohol use. He reports that he does not use drugs.     The patient's relevant past medical, surgical and social history were reviewed and updated in Epic as appropriate.      Review of Systems  As described in the HPI. Other systems reviewed and negative.    Objective   O     Vitals:  Temp: 98.3 °F (36.8 °C) (11/26/20 1800) Temp  Min: 98.3 °F (36.8 °C)  Max: 98.3 °F (36.8 °C)   Temp core:      BP: 105/47 (11/26/20 1845) BP  Min: 84/67  Max: 125/93   Pulse: 68 (11/26/20 1845) Pulse  Min: 68  Max: 71   Resp: 16 (11/26/20 1845) Resp  Min: 16  Max: 20   SpO2: 96 % (11/26/20 1845) SpO2  Min: 93 %  Max: 98 %   Device: room air (11/26/20 1845)    Flow Rate:   No data recorded     Intake/Ouptut 24 hrs (7:00AM - 6:59 AM)  No intake or output data in the 24 hours ending 11/26/20 1925     Examination    Telemetry:  Rhythm: normal sinus rhythm (11/26/20 1752)         Constitutional:  No acute distress.   Cardiovascular: RRR.   Normal heart sounds.  No murmurs, gallop or rub.   Respiratory:  Normal breath sounds  No adventitious sounds.   Abdominal:  Soft with no tenderness.  No distension.   No HSM.   Extremities: Warm.  Dry.  No cyanosis.  No Edema   Neurological:   Awake.  Best Eye Response: 3-->(E3) to speech (11/26/20 1752)  Best Motor Response: 6-->(M6) obeys commands (11/26/20 1752)  Best Verbal Response: 2-->(V2) incomprehensible speech (11/26/20 1752)  [place] Coma Scale Score: 11 (11/26/20 1752)       Hematology:      Chemistry:                Cardiac Labs:        COVID-19  No results found for: COVID19    Images:  No radiology results for the last day    Results: Reviewed.  I reviewed the patient's new laboratory and imaging results.  I independently reviewed the patient's new images.    Medications: Reviewed.    Assessment/Plan   A / P     Assessment:    60 y.o.male, admitted on 11/26/2020 with CVA (cerebral vascular accident) (CMS/[facility]) [I63.9]:     1. AIS CVA L M2 Occlusion  1. S/p Mechanical Thrombectomy attempt per [place]  2. HTN  3. ESRD on HD - MWF  4. H/O cancer about 4 years ago. Brother does not know what kind of cancer.  5. Non smoker.  6. Blindness  7. DM        No results found for: HGBA1C    Advance Directives: Code Status and Medical Interventions:   Ordered at: 11/26/20 1746     Code Status:    CPR     Medical Interventions (Level of Support Prior to Arrest):    Full        Plan:    1. AIS pathway  2. Records from [facility]  3. Discussed with Brother Hudson Matthews [phone]  4. Discussed with Dr. Haynes  5. Disposition: Keep in ICU.     Plan of care and goals reviewed during interdisciplinary rounds.  I discussed the patient's findings and my recommendations with patient and family    Level of Risk is High due to:  illness with threat to life or bodily function.     I have personally seen, interviewed and examined the patient and verified all the key components of the history, physical examination, assessment and plan with HUSEYIN Keita, ACNP-BC and reviewed the note, which  reflects my changes and contributions.    Time: was greater than 35 minutes.    (This excludes time spent performing separately reportable procedures and services).  Patient was at high risk of imminent or life-threatening deterioration in his condition due to CVA.     Aric Yao MD, FACP, FCCP, CNSC  Intensive Care Medicine, Nutrition Support and Pulmonary Medicine     [x]  Primary Attending  []  Consultant    Electronically signed by Aric Yao MD at 20 0050            Discharge Summary      Bridget Stout, APRN at 20 1525     Attestation signed by Alva Cm DO at 20 1612      Attending Devin     I supervised care of the patient on day of service with direct care provided by the advanced care provider (APC).    Alva Cm DO  20                         [facility] Medicine Services  DISCHARGE SUMMARY    Patient Name: Nain Matthews  : 1960  MRN: [id number]    Date of Admission: 2020  4:04 PM  Date of Discharge:  2020  Primary Care Physician: Provider, No Known    Consults     Date and Time Order Name Status Description    2020 0032 Inpatient Nephrology Consult      2020 0032 Inpatient Neurology Consult Stroke Completed           Hospital Course     Presenting Problem:   CVA (cerebral vascular accident) (CMS/[facility]) [I63.9]    Active Hospital Problems    Diagnosis  POA   • **Acute CVA (cerebrovascular accident) (CMS/[facility]) [I63.9]  Yes   • Blind hypertensive eye, bilateral [H35.033]  Yes   • DM (diabetes mellitus) (CMS/[facility]) [E11.9]  Yes   • ESRD (end stage renal disease) (CMS/[facility]) [N18.6]  Yes   • HTN (hypertension) [I10]  Yes      Resolved Hospital Problems   No resolved problems to display.          Hospital Course:  Nain Matthews is a 60 y.o. male Omani-speaking with a history of blindness,  diabetes and ESRD on HD who was transferred to [facility] on 20 from [facility] for stroke. He was found to have a  left MCA stroke. He was given tPA and transferred to [facility] for Neurointervention. He was taken to the cath lab by Dr. Haynes. However, mechanical thrombectomy was not successful. He was admitted to the ICU after the procedure. Pt transferred to the floor on 11/28.     L MCA Stroke  - s/p tPA and failed mechanical thrombectomy. Etiology likely cardioembolic  - MRI brain 11/28 with acute infarct L MCA territory without evidence of hemorrhage.  -JUVENCIO completed today with finding of small PFO and enlarged left antrium concerning for underlying A fib. Placed on renally dosed eliquis. Will continue with 30 day event monitor and follow up with cardiology in 6 weeks to review results. +/- loop recorder at that time  - Continue ASA/ high intensity statin   - Planning home with outpatient PT/OT/SLP    HTN  -On No home meds  -started Norvasc 10mg daily, Hydralazine 25mg TID. Close follow up with PCP for BP monitoring      DM2  -Hga1c 6.0  -Glucose WNL, continue carb consistent diet at dc     HLD  -High intensity statin      ESRD on HD  -Nephrology following, HD on MWF  -ROMAINE on HD days      Discharge Follow Up Recommendations for outpatient labs/diagnostics:  Follow up with PCP this coming week. BP check  Continue HD MWF, follow up with renal as scheduled  Neurology follow up, Dr. Hunter ~ 4 weeks  Continue 30day cardiac monitor and follow up with Dr. Simon in 6 weeks.      Day of Discharge     HPI: No complaints per patient. Underwent JUVENCIO this afternoon and now eating. Walking well. No pain, soa, dizziness, cp, and wants to go home    Review of Systems  Gen- No fevers, chills  CV- No chest pain, palpitations  Resp- No cough, dyspnea  GI- No N/V/D, abd pain    Vital Signs:   Temp:  [97.2 °F (36.2 °C)-98.1 °F (36.7 °C)] 98.1 °F (36.7 °C)  Heart Rate:  [59-72] 63  Resp:  [16-20] 18  BP: (106-197)/() 158/91     Physical Exam:  Constitutional: No acute distress, awake, alert  HENT: NCAT, mucous membranes moist  Respiratory: Clear  to auscultation bilaterally, respiratory effort normal   Cardiovascular: RRR, no murmurs, rubs, or gallops, palpable pedal pulses bilaterally  Gastrointestinal: Positive bowel sounds, soft, nontender, nondistended  Musculoskeletal: No bilateral ankle edema  Psychiatric: Appropriate affect, cooperative  Neurologic: Oriented x 3, strength symmetric in all extremities, Cranial Nerves grossly intact to confrontation, speech clear, Uzbek speaking  Skin: No rashes      Pertinent  and/or Most Recent Results     Results from last 7 days   Lab Units 11/29/20  0555 11/28/20  0406 11/27/20  0443   WBC 10*3/mm3 5.69 5.32 6.67   HEMOGLOBIN g/dL 9.8* 10.2* 9.7*   HEMATOCRIT % 29.8* 31.1* 29.5*   PLATELETS 10*3/mm3 99* 92* 101*   SODIUM mmol/L 137 140 139   POTASSIUM mmol/L 4.0 4.1 3.6   CHLORIDE mmol/L 99 103 97*   CO2 mmol/L 23.0 24.0 29.0   BUN mg/dL 38* 26* 41*   CREATININE mg/dL 10.02* 7.26* 9.12*   GLUCOSE mg/dL 61* 77 98   CALCIUM mg/dL 8.8 9.1 8.7     Results from last 7 days   Lab Units 11/27/20  0443   BILIRUBIN mg/dL 0.6   ALK PHOS U/L 99   ALT (SGPT) U/L 10   AST (SGOT) U/L 7     Results from last 7 days   Lab Units 11/27/20  0443   CHOLESTEROL mg/dL 76   TRIGLYCERIDES mg/dL 65   HDL CHOL mg/dL 34*   LDL CHOL mg/dL 27     Results from last 7 days   Lab Units 11/27/20  0443   HEMOGLOBIN A1C % 6.00*       Brief Urine Lab Results     None          Microbiology Results Abnormal     Procedure Component Value - Date/Time    COVID PRE-OP / PRE-PROCEDURE SCREENING ORDER (NO ISOLATION) - Swab, Nasopharynx [[id number]]  (Normal) Collected: 11/26/20 2126    Lab Status: Final result Specimen: Swab from Nasopharynx Updated: 11/26/20 2216    Narrative:      The following orders were created for panel order COVID PRE-OP / PRE-PROCEDURE SCREENING ORDER (NO ISOLATION) - Swab, Nasopharynx.  Procedure                               Abnormality         Status                     ---------                               -----------          ------                     COVID-19,CEPHEID,DORYS IN-...[[id number]]  Normal              Final result                 Please view results for these tests on the individual orders.    COVID-19,CEPHEID,DORYS IN-HOUSE(OR EMERGENT/ADD-ON),NP SWAB IN TRANSPORT MEDIA 3-4 HR TAT - Swab, Nasopharynx [[id number]]  (Normal) Collected: 11/26/20 2126    Lab Status: Final result Specimen: Swab from Nasopharynx Updated: 11/26/20 2216     COVID19 Not Detected    Narrative:      Fact sheet for providers: https://www.fda.gov/media/780759/download     Fact sheet for patients: https://www.fda.gov/media/974554/download          Imaging Results (All)     Procedure Component Value Units Date/Time    MRI Brain Without Contrast [[id number]] Collected: 11/28/20 1817     Updated: 11/28/20 1855    Narrative:      EXAMINATION: MRI BRAIN WO CONTRAST-      INDICATION: post tpa and thrombectomy; I63.9-Cerebral infarction,  unspecified; R13.11-Dysphagia, oral phase; R47.01-Aphasia     TECHNIQUE: Multiplanar multisequence MRI of the brain performed without  IV contrast     COMPARISON: CT head 1 day prior     FINDINGS: There are diffuse areas of cortical restricted diffusion  throughout the left MCA territory, with predominant frontal, parietal  and temporal lobe involvement, compatible with acute left MCA territory  infarct. No associated hemorrhage. Moderate local edema with sulcal  effacement, without global mass effect or midline shift. There is  moderate volume loss with ex vacuo prominence of the ventricles, without  evidence of hydrocephalus. There is no evidence of intracranial mass,  mass effect or hemorrhage otherwise. The orbits are normal and the  paranasal sinuses are grossly clear. Artifact from left retina surgery  noted.       Impression:      There is a primarily cortical involvement with acute infarct  in the left MCA territory, without evidence of associated hemorrhage.  Mild sulcal effacement, without global mass effect or midline  shift.        This report was finalized on 11/28/2020 6:52 PM by Tad Dela Cruz.       CT Head Without Contrast [[id number]] Collected: 11/27/20 1753     Updated: 11/27/20 1800    Narrative:      EXAMINATION: CT HEAD WO CONTRAST-      INDICATION: Stroke, follow up; R13.11-Dysphagia, oral phase     TECHNIQUE: Axial noncontrast CT of the head with multiplanar  reconstruction     The radiation dose reduction device was turned on for each scan per the  ALARA (As Low as Reasonably Achievable) protocol.     COMPARISON: NONE     FINDINGS: Subtle area of transcortical hypoattenuation in the left  frontal operculum, possibly evolving acute infarct. There is otherwise  no evidence of intracranial hemorrhage, mass or mass effect. The  ventricles are normal in size and configuration. The orbits are normal  and the paranasal sinuses are grossly clear. The calvarium is intact.       Impression:      Subtle area of transcortical hypoattenuation in the left  frontal operculum, possibly evolving acute infarct. There is otherwise  no evidence of intracranial hemorrhage, mass or mass effect.         This report was finalized on 11/27/2020 5:57 PM by Tad Dela Cruz.                       Results for orders placed during the hospital encounter of 11/26/20   Adult Transthoracic Echo Complete W/ Cont if Necessary Per Protocol (With Agitated Saline)    Narrative · Left ventricular systolic function is mildly decreased. Left ventricular   ejection fraction appears to be 45%.  · Left ventricular wall thickness is consistent with mild concentric   hypertrophy.  · Left ventricular diastolic function is consistent with (grade III w/high   LAP) reversible restrictive pattern.  · Mildly reduced right ventricular systolic function noted.  · Left atrial volume is severely increased.  · Mild to moderate aortic valve regurgitation is present.  · Mild mitral annular calcification is present.  · Mild mitral valve regurgitation is present.  · Mild  tricuspid valve regurgitation is present.  · Estimated right ventricular systolic pressure from tricuspid   regurgitation is moderately elevated (45-55 mmHg).  · Mild dilation of the aortic root is present.  · There is a left pleural effusion.          Plan for Follow-up of Pending Labs/Results:     Discharge Details        Discharge Medications      New Medications      Instructions Start Date   amLODIPine 10 MG tablet  Commonly known as: NORVASC   10 mg, Oral, Every 24 Hours Scheduled   Start Date: December 2, 2020     apixaban 2.5 MG tablet tablet  Commonly known as: ELIQUIS   2.5 mg, Oral, Every 12 Hours Scheduled      aspirin 81 MG chewable tablet   81 mg, Oral, Daily   Start Date: December 2, 2020     atorvastatin 40 MG tablet  Commonly known as: LIPITOR   40 mg, Oral, Nightly      hydrALAZINE 25 MG tablet  Commonly known as: APRESOLINE   25 mg, Oral, Every 8 Hours Scheduled             Allergies   Allergen Reactions   • Levofloxacin Unknown - Low Severity         Discharge Disposition:  Home or Self Care    Diet:  Hospital:  Diet Order   Procedures   • NPO Diet       Activity:  Activity Instructions     Activity as Tolerated            Restrictions or Other Recommendations:         CODE STATUS:    Code Status and Medical Interventions:   Ordered at: 11/26/20 1746     Code Status:    CPR     Medical Interventions (Level of Support Prior to Arrest):    Full       No future appointments.    Additional Instructions for the Follow-ups that You Need to Schedule     Ambulatory Referral to Physical Therapy   As directed      Ambulatory Referral to Speech Therapy   As directed      Patient is New Zealander speaking only    Order Comments: Patient is New Zealander speaking only          Discharge Follow-up with PCP   As directed       Currently Documented PCP:    Provider, No Known    PCP Phone Number:    None     Follow Up Details: 1 week- bp check         Discharge Follow-up with Specified Provider: Dr. Simon; 6 Weeks   As  directed      To: Dr. Simon    Follow Up: 6 Weeks         Discharge Follow-up with Specified Provider: [facility]- Dr. Hunter; 1 Month   As directed      To: [facility]- Dr. Hunter    Follow Up: 1 Month                     HUSEYIN Khalil  12/01/20      Time Spent on Discharge:  I spent  60  minutes on this discharge activity which included: face-to-face encounter with the patient, reviewing the data in the system, coordination of the care with the nursing staff as well as consultants, documentation, and entering orders.        Electronically signed by HUSEYIN Khalil, 12/01/20, 3:26 PM EST.      Electronically signed by Alva Cm DO at 12/01/20 1642

## 2020-12-02 NOTE — NURSING NOTE
"Brother (Hudson) at bedside and is questioning why pt is being discharged. The brother states the pt needs to stay in the hospital to have dialysis tomorrow. Spoke with JEFF Palomino via phone to notify that the brother is stating that the pt needed to stay for dialysis. Charge nurse notified as well. After speaking with the brother and questioning if there were a reason the pt could not get to dialysis tomorrow if discharge the brother states \"because it is to cold\". Bridget Stout notified via phone of the brothers response. Per Bridget Stout, pt is to be discharge. Brother is angry and making excuses as to why he cannot take the pt home. Educated on no medical necessity to keep pt. Pt discharged per order. Stroke education provided.  "

## 2020-12-07 ENCOUNTER — READMISSION MANAGEMENT (OUTPATIENT)
Dept: CALL CENTER | Facility: HOSPITAL | Age: 60
End: 2020-12-07

## 2020-12-07 NOTE — OUTREACH NOTE
Stroke Week 1 Survey      Responses   [facility] patient discharged from?  [place]   Does the patient have one of the following disease processes/diagnoses(primary or secondary)?  Stroke (TIA)   Week 1 attempt successful?  No   Unsuccessful attempts  Attempt 1          William Sloan RN

## 2020-12-14 ENCOUNTER — READMISSION MANAGEMENT (OUTPATIENT)
Dept: CALL CENTER | Facility: HOSPITAL | Age: 60
End: 2020-12-14

## 2020-12-14 NOTE — OUTREACH NOTE
Stroke Week 3 Survey      Responses   [facility] patient discharged from?  [place]   Does the patient have one of the following disease processes/diagnoses(primary or secondary)?  Stroke (TIA)   Week 3 attempt successful?  Yes   Call start time  1603   Call end time  1617   Discharge diagnosis  **Acute CVA (cerebrovascular accident   If primary language is not English what is the name and relationship or agency of  used?  [employer]  #[id number]   Meds reviewed with patient/caregiver?  Yes   Is the patient having any side effects they believe may be caused by any medication additions or changes?  No   Does the patient have all medications ordered at discharge?  Yes   Is the patient taking all medications as directed (includes completed medication regime)?  Yes   Does the patient have a primary care provider?   No   PCP Nursing Intervention  Provided number to obtain PCP   Does the patient have an appointment with their PCP within 7 days of discharge?  No   Comments regarding PCP  Phone # given for [facility]   What is preventing the patient from scheduling follow up appointments within 7 days of discharge?  Unsure of when or with whom   Nursing Interventions  Verified appointment date/time/provider   Has the patient kept scheduled appointments due by today?  N/A   Comments  Patient has Neuro appt and date, time and phone # given.   Has home health visited the patient within 72 hours of discharge?  N/A   Psychosocial issues?  No   Does the patient require any assistance with activities of daily living such as eating, bathing, dressing, walking, etc.?  No   Does the patient have any residual symptoms from stroke/TIA?  No   Does the patient understand the diet ordered at discharge?  Yes   Did the patient receive a copy of their discharge instructions?  Yes   Nursing interventions  Reviewed instructions with patient   What is the patient's perception of their health status since  discharge?  Improving   Is the patient/caregiver able to teach back the risk factors for a stroke?  High blood pressure-goal below 120/80   Is the patient/caregiver able to teach back signs and symptoms related to disease process for when to call PCP?  Yes   Is the patient/caregiver able to teach back signs and symptoms related to disease process for when to call 911?  Yes   Is the patient/caregiver able to teach back the hierarchy of who to call/visit for symptoms/problems? PCP, Specialist, Home health nurse, Urgent Care, ED, 911  Yes   Additional teach back comments  Patient goes to [facility].  States he is fine and doing well.  No need for therapy.  Will call # to get a new PCP.  Was unaware of Neuro appt. Doctor, date, time and phone # given to pt.  States he has been praying to God and doing well.   Week 3 call completed?  Yes   Revoked  No further contact(revokes)-requires comment   Is the patient interested in additional calls from an ambulatory ?  NOTE:  applies to high risk patients requiring additional follow-up.  No   Graduated/Revoked comments  Denies questions or needs at this time          Modesta Trent LPN

## 2020-12-21 ENCOUNTER — TELEPHONE (OUTPATIENT)
Dept: CARDIOLOGY | Facility: CLINIC | Age: 60
End: 2020-12-21

## 2020-12-21 NOTE — TELEPHONE ENCOUNTER
I received a message from [employer] today, that said that this patient's [facility] order was cancelled. They have  contacted the patient regularly, but he has failed to return their calls or activate on their own.

## 2021-01-18 ENCOUNTER — OFFICE VISIT (OUTPATIENT)
Dept: CARDIOLOGY | Facility: CLINIC | Age: 61
End: 2021-01-18

## 2021-01-18 VITALS
WEIGHT: 154 LBS | OXYGEN SATURATION: 96 % | SYSTOLIC BLOOD PRESSURE: 148 MMHG | BODY MASS INDEX: 24.87 KG/M2 | HEART RATE: 85 BPM | DIASTOLIC BLOOD PRESSURE: 82 MMHG

## 2021-01-18 DIAGNOSIS — E78.2 MIXED HYPERLIPIDEMIA: ICD-10-CM

## 2021-01-18 DIAGNOSIS — I10 ESSENTIAL HYPERTENSION: ICD-10-CM

## 2021-01-18 DIAGNOSIS — I48.0 PAROXYSMAL ATRIAL FIBRILLATION (HCC): Primary | ICD-10-CM

## 2021-01-18 DIAGNOSIS — I63.312 CEREBROVASCULAR ACCIDENT (CVA) DUE TO THROMBOSIS OF LEFT MIDDLE CEREBRAL ARTERY (HCC): ICD-10-CM

## 2021-01-18 PROCEDURE — 99244 OFF/OP CNSLTJ NEW/EST MOD 40: CPT | Performed by: INTERNAL MEDICINE

## 2021-01-18 RX ORDER — ATENOLOL 50 MG/1
50 TABLET ORAL DAILY
COMMUNITY
Start: 2020-11-14 | End: 2021-03-01

## 2021-01-18 RX ORDER — WARFARIN SODIUM 4 MG/1
4 TABLET ORAL DAILY
COMMUNITY
Start: 2020-12-22

## 2021-01-18 RX ORDER — ATORVASTATIN CALCIUM 40 MG/1
40 TABLET, FILM COATED ORAL NIGHTLY
Qty: 90 TABLET | Refills: 3 | Status: SHIPPED | OUTPATIENT
Start: 2021-01-18 | End: 2021-03-01

## 2021-01-18 NOTE — PROGRESS NOTES
[facility] Cardiology  [address], [address]  [place], [place], [address]    [phone]  [facility]           OUTPATIENT CLINIC CONSULTATION NOTE    Patient care team:  Patient Care Team:  Provider, No Known as PCP - General    Requesting Provider and Reason for consultation: The patient is being seen today at the request of Bridget Stout for possible cardioembolic CVA.     Subjective:   Chief complaint:   Chief Complaint   Patient presents with   • Hypertension       HPI:    Nain Matthews is a 60 y.o. male.  Partial problem list, including cardiac problems:  1. Left MCA stroke  a. Status post TPA and failed mechanical thrombectomy.  b. JUVENCIO with small PFO and severely enlarged left atrium.  2. Mild, chronic systolic heart failure  3. Diabetes mellitus  4. ESRD on HD  a. Followed by Dr. Tam  5. Hypertension  6. Hyperlipidemia    He presents today for consultation with his son and ..    The patient suffered a left MCA CVA on 11/26/2020 he is status post TPA and fell mechanical thrombectomy.  JUVENCIO was completed with noted small PFO and a dilated left atrium.  The patient was discharged from [facility] with a 30-day M cot.  The patient did not wear this.  His Eliquis was transitioned to warfarin by nephrology due to cost.  He denies any chest pain, shortness of breath, or palpitations.  He has intermittent lower extremity edema.  He denies any bleeding diatheses.  He denies any familial history of CAD.  He denies tobacco, alcohol, or drug use.    Review of Systems:  Positive for lower extremity edema  All other systems reviewed and are negative.    PFSH:  Patient Active Problem List   Diagnosis   • Acute CVA (cerebrovascular accident) (CMS/[facility])   • CVA (cerebral vascular accident) (CMS/[facility])   • Blind hypertensive eye, bilateral   • DM (diabetes mellitus) (CMS/HCC)   • ESRD (end stage renal disease) (CMS/[facility])   • HTN (hypertension)         Current  Outpatient Medications:   •  amLODIPine (NORVASC) 10 MG tablet, Take 1 tablet by mouth Daily., Disp: 30 tablet, Rfl: 0  •  aspirin 81 MG chewable tablet, Chew 1 tablet Daily., Disp: 30 tablet, Rfl: 0  •  atenolol (TENORMIN) 50 MG tablet, Take 50 mg by mouth Daily., Disp: , Rfl:   •  atorvastatin (LIPITOR) 40 MG tablet, Take 1 tablet by mouth Every Night., Disp: 90 tablet, Rfl: 3  •  hydrALAZINE (APRESOLINE) 25 MG tablet, Take 1 tablet by mouth Every 8 (Eight) Hours., Disp: 90 tablet, Rfl: 0  •  warfarin (COUMADIN) 3 MG tablet, Take 3 mg by mouth Daily., Disp: , Rfl:     Allergies   Allergen Reactions   • Levofloxacin Unknown - Low Severity       Social History     Socioeconomic History   • Marital status:      Spouse name: Not on file   • Number of children: Not on file   • Years of education: Not on file   • Highest education level: Not on file   Tobacco Use   • Smoking status: Never Smoker   Substance and Sexual Activity   • Alcohol use: Not Currently   • Drug use: Never   • Sexual activity: Defer     Family History   Problem Relation Age of Onset   • Hypertension Father          Objective:   Physical Exam:  /82 (BP Location: Right arm, Patient Position: Sitting)   Pulse 85   Wt 69.9 kg (154 lb)   SpO2 96%   BMI 24.87 kg/m²   CONSTITUTIONAL: Well-nourished. In no acute distress.   SKIN: Warm and dry. No rashes noted  HEENT: Head is normocephalic and atraumatic.  Mucous membranes are pink and moist.   NECK: Supple without masses or thyromegaly. There is no jugular venous distention   LUNGS: Normal effort. Clear to auscultation bilaterally without wheezing, rhonchi, or rales noted.   CARDIOVASCULAR: The heart has a regular rate and rhythm with a normal S1 and S2. There is no murmur, gallop, rub, or click appreciated.   PERIPHERAL VASCULAR: Carotid upstroke is 2+ bilaterally and without bruits. Radial pulses are 2+ bilaterally. There is mild lower extremity edema.   ABDOMEN: Normal bowel sounds.   Soft with no tenderness with palpitation. No hepatosplenomegaly  MUSCULOSKELETAL: Normal gait. No digital cyanosis  NEUROLOGICAL: CN II - XII grossly intact  PSYCHIATRIC: Alert, orientated x 3, appropriate affect and mood    Labs:  BUN   Date Value Ref Range Status   11/29/2020 38 (H) 6 - 20 mg/dL Final     Creatinine   Date Value Ref Range Status   11/29/2020 10.02 (H) 0.76 - 1.27 mg/dL Final     Potassium   Date Value Ref Range Status   11/29/2020 4.0 3.5 - 5.2 mmol/L Final     ALT (SGPT)   Date Value Ref Range Status   11/27/2020 10 1 - 41 U/L Final     AST (SGOT)   Date Value Ref Range Status   11/27/2020 7 1 - 40 U/L Final     WBC   Date Value Ref Range Status   11/29/2020 5.69 3.40 - 10.80 10*3/mm3 Final     Hemoglobin   Date Value Ref Range Status   11/29/2020 9.8 (L) 13.0 - 17.7 g/dL Final     Hematocrit   Date Value Ref Range Status   11/29/2020 29.8 (L) 37.5 - 51.0 % Final     Platelets   Date Value Ref Range Status   11/29/2020 99 (L) 140 - 450 10*3/mm3 Final       Lab Results   Component Value Date    CHOL 76 11/27/2020     Lab Results   Component Value Date    TRIG 65 11/27/2020     Lab Results   Component Value Date    HDL 34 (L) 11/27/2020     Lab Results   Component Value Date    LDL 27 11/27/2020     No components found for: LDLDIRECTC    Diagnostic Data:    Procedures    Results for orders placed during the hospital encounter of 11/26/20   Adult Transesophageal Echo (JUVENCIO) W/ Cont if Necessary Per Protocol    Narrative · Left ventricular systolic function is normal. Estimated left ventricular   EF = 55%  · The left atrial cavity is dilated.  · The right atrial cavity is dilated.  · Positive bubble study for evidence of a PFO.  · Mild aortic valve regurgitation is present.  · Borderline dilation of the ascending aorta is present.  · There is mild plaque in the descending aorta present.  · There is a left pleural effusion.        TTE 11/27/2020  · Left ventricular systolic function is mildly  decreased. Left ventricular ejection fraction appears to be 45%.  · Left ventricular wall thickness is consistent with mild concentric hypertrophy.  · Left ventricular diastolic function is consistent with (grade III w/high LAP) reversible restrictive pattern.  · Mildly reduced right ventricular systolic function noted.  · Left atrial volume is severely increased.  · Mild to moderate aortic valve regurgitation is present.  · Mild mitral annular calcification is present.  · Mild mitral valve regurgitation is present.  · Mild tricuspid valve regurgitation is present.  · Estimated right ventricular systolic pressure from tricuspid regurgitation is moderately elevated (45-55 mmHg).  · Mild dilation of the aortic root is present.  · There is a left pleural effusion.    Assessment and Plan:   Diagnoses and all orders for this visit:    Suspected paroxysmal atrial fibrillation   Severely dilated left atrium  Cerebrovascular accident (CVA) due to thrombosis of left middle cerebral artery   Mixed hyperlipidemia  -Left MCA stroke suspected to be cardioembolic.  JUVENCIO with dilated left atrium and small PFO.  -Continue aspirin, high intensity statin, and warfarin.  Warfarin being monitored by nephrology.  -14-day Zio patch to evaluate for arrhythmia.  If without arrhythmia will recommend an implantable loop recorder.  Discussed the loop recorder procedure with the patient briefly today.  Had him hold a loop recorder model for reference since he is blind    Mild systolic heart failure, chronic  -NYHA functional class II  -Volume management by dialysis  -Continue beta-blocker, hydralazine  -We will consider switching atenolol to carvedilol for heart failure as well as for better blood pressure control if blood pressure remains elevated  -We will consider the addition of Imdur at a future visit    Essential hypertension  -Elevated this visit  -Continue current medicines    - Return in about 6 weeks (around 3/1/2021).    Scribed for  Ruddy Simon MD by Araseli Liu, HUSEYIN. 1/18/2021  16:55 EST    I, Ruddy Simon MD, personally performed the services as scribed by the above named individual. I have made any necessary edits and it is both accurate and complete.     Ruddy Simon MD, MSc, FACC, [facility]  Interventional Cardiology  [facility]

## 2021-03-01 ENCOUNTER — OFFICE VISIT (OUTPATIENT)
Dept: CARDIOLOGY | Facility: CLINIC | Age: 61
End: 2021-03-01

## 2021-03-01 VITALS
HEART RATE: 59 BPM | WEIGHT: 152 LBS | DIASTOLIC BLOOD PRESSURE: 78 MMHG | OXYGEN SATURATION: 99 % | HEIGHT: 66 IN | SYSTOLIC BLOOD PRESSURE: 144 MMHG | BODY MASS INDEX: 24.43 KG/M2

## 2021-03-01 DIAGNOSIS — I48.0 PAROXYSMAL ATRIAL FIBRILLATION (HCC): Primary | ICD-10-CM

## 2021-03-01 DIAGNOSIS — I63.312 CEREBROVASCULAR ACCIDENT (CVA) DUE TO THROMBOSIS OF LEFT MIDDLE CEREBRAL ARTERY (HCC): ICD-10-CM

## 2021-03-01 DIAGNOSIS — I10 ESSENTIAL HYPERTENSION: ICD-10-CM

## 2021-03-01 DIAGNOSIS — E78.2 MIXED HYPERLIPIDEMIA: ICD-10-CM

## 2021-03-01 PROCEDURE — 99214 OFFICE O/P EST MOD 30 MIN: CPT | Performed by: INTERNAL MEDICINE

## 2021-03-01 RX ORDER — METOPROLOL TARTRATE 50 MG/1
50 TABLET, FILM COATED ORAL 2 TIMES DAILY
COMMUNITY

## 2021-03-01 NOTE — PROGRESS NOTES
[facility] Cardiology  [address], [address]  [place], [place], [address] [phone]  [facility]           OUTPATIENT CLINIC PROGRESS NOTE    Patient care team:  Patient Care Team:  Provider, No Known as PCP - General      Subjective:   Chief complaint:   Chief Complaint   Patient presents with   • Paroxysmal Atrial Fibrillation       HPI:    Nain Matthews is a 60 y.o. male.  Partial problem list, including cardiac problems:  1. Left MCA stroke 11/2020  a. Status post TPA and failed mechanical thrombectomy.  b. JUVENCIO with small PFO and severely enlarged left atrium.  2. Mild, chronic systolic heart failure  3. Diabetes mellitus  4. ESRD on HD  a. Followed by Dr. Tam  5. Hypertension  6. Hyperlipidemia    He presents today for follow-up.  The patient was seen with a  over the phone.    The patient wore his heart monitor for 14 days.  Did not return it but did bring it with him today.  As such, results are not yet available.    Denies chest pain, dyspnea, palpitations.  Intermittent lower extremity swelling.    Had some bleeding from his dialysis access site that lasted for a little bit longer than usual today.    He denies any familial history of CAD.  He denies tobacco, alcohol, or drug use.    Review of Systems:  Positive for intermittent lower extremity edema    PFSH:  Patient Active Problem List   Diagnosis   • Acute CVA (cerebrovascular accident) (CMS/HCC)   • CVA (cerebral vascular accident) (CMS/HCC)   • Blind hypertensive eye, bilateral   • DM (diabetes mellitus) (CMS/HCC)   • ESRD (end stage renal disease) (CMS/[facility])   • HTN (hypertension)         Current Outpatient Medications:   •  amLODIPine (NORVASC) 10 MG tablet, Take 1 tablet by mouth Daily., Disp: 30 tablet, Rfl: 0  •  metoprolol tartrate (LOPRESSOR) 50 MG tablet, Take 50 mg by mouth 2 (Two) Times a Day., Disp: , Rfl:   •  warfarin (COUMADIN) 4 MG tablet, Take 4 mg by mouth Daily., Disp: ,  "Rfl:     Allergies   Allergen Reactions   • Levofloxacin Unknown - Low Severity       Social History     Socioeconomic History   • Marital status:      Spouse name: Not on file   • Number of children: Not on file   • Years of education: Not on file   • Highest education level: Not on file   Tobacco Use   • Smoking status: Never Smoker   • Smokeless tobacco: Never Used   Substance and Sexual Activity   • Alcohol use: Not Currently   • Drug use: Never   • Sexual activity: Defer     Family History   Problem Relation Age of Onset   • Hypertension Father          Objective:   Physical Exam:  /78 (BP Location: Right arm, Patient Position: Sitting)   Pulse 59   Ht 167.6 cm (66\")   Wt 68.9 kg (152 lb)   SpO2 99%   BMI 24.53 kg/m²   CONSTITUTIONAL: Well-nourished. In no acute distress.   Peripheral exam: Evidence of recent oozing from AV fistula.  Redressed the access site in the office today  PSYCHIATRIC: Appropriate affect and mood    Labs:  BUN   Date Value Ref Range Status   11/29/2020 38 (H) 6 - 20 mg/dL Final     Creatinine   Date Value Ref Range Status   11/29/2020 10.02 (H) 0.76 - 1.27 mg/dL Final     Potassium   Date Value Ref Range Status   11/29/2020 4.0 3.5 - 5.2 mmol/L Final     ALT (SGPT)   Date Value Ref Range Status   11/27/2020 10 1 - 41 U/L Final     AST (SGOT)   Date Value Ref Range Status   11/27/2020 7 1 - 40 U/L Final     WBC   Date Value Ref Range Status   11/29/2020 5.69 3.40 - 10.80 10*3/mm3 Final     Hemoglobin   Date Value Ref Range Status   11/29/2020 9.8 (L) 13.0 - 17.7 g/dL Final     Hematocrit   Date Value Ref Range Status   11/29/2020 29.8 (L) 37.5 - 51.0 % Final     Platelets   Date Value Ref Range Status   11/29/2020 99 (L) 140 - 450 10*3/mm3 Final       Lab Results   Component Value Date    CHOL 76 11/27/2020     Lab Results   Component Value Date    TRIG 65 11/27/2020     Lab Results   Component Value Date    HDL 34 (L) 11/27/2020     Lab Results   Component Value Date    " LDL 27 11/27/2020     No components found for: LDLDIRECTC    Diagnostic Data:    Procedures    Results for orders placed during the hospital encounter of 11/26/20   Adult Transesophageal Echo (JUVENCIO) W/ Cont if Necessary Per Protocol    Narrative · Left ventricular systolic function is normal. Estimated left ventricular   EF = 55%  · The left atrial cavity is dilated.  · The right atrial cavity is dilated.  · Positive bubble study for evidence of a PFO.  · Mild aortic valve regurgitation is present.  · Borderline dilation of the ascending aorta is present.  · There is mild plaque in the descending aorta present.  · There is a left pleural effusion.        TTE 11/27/2020  · Left ventricular systolic function is mildly decreased. Left ventricular ejection fraction appears to be 45%.  · Left ventricular wall thickness is consistent with mild concentric hypertrophy.  · Left ventricular diastolic function is consistent with (grade III w/high LAP) reversible restrictive pattern.  · Mildly reduced right ventricular systolic function noted.  · Left atrial volume is severely increased.  · Mild to moderate aortic valve regurgitation is present.  · Mild mitral annular calcification is present.  · Mild mitral valve regurgitation is present.  · Mild tricuspid valve regurgitation is present.  · Estimated right ventricular systolic pressure from tricuspid regurgitation is moderately elevated (45-55 mmHg).  · Mild dilation of the aortic root is present.  · There is a left pleural effusion.    Assessment and Plan:   Diagnoses and all orders for this visit:    Suspected paroxysmal atrial fibrillation   Severely dilated left atrium  Cerebrovascular accident (CVA) due to thrombosis of left middle cerebral artery   Mixed hyperlipidemia  -Left MCA stroke suspected to be cardioembolic.  JUVENCIO with dilated left atrium and small PFO.  -Continue aspirin, high intensity statin, and warfarin.  Warfarin being monitored by nephrology.  -14-day heart  monitor results pending.  Patient brought in his monitor to us in the clinic today.  We will interpret that in the next couple days and give him a call.  -If the heart monitor is negative for A. fib, recommend implantation of a loop recorder.  This was discussed at length with the patient with the help of a  over the phone.  He seems to be agreeable to proceed.    Mild systolic heart failure, chronic  -NYHA functional class II  -Volume management by dialysis  -Continue beta-blocker, hydralazine  -If with heart failure symptoms, can consider the addition of Imdur    Essential hypertension  -Continue current medicines    - Return in about 6 months (around 9/1/2021) for Next scheduled follow-up with an ECG.    Ruddy Simon MD, MSc, FACC, [facility]  Interventional Cardiology  [facility]

## 2021-04-14 ENCOUNTER — TELEPHONE (OUTPATIENT)
Dept: CARDIOLOGY | Facility: CLINIC | Age: 61
End: 2021-04-14

## 2021-04-14 NOTE — TELEPHONE ENCOUNTER
"Spoke with patient via . Discussed monitor results and recommendations per MJS. Patient states that he does not feel that he needs the loop recorder at this time. Patient reports that he \"is feeling good\" and wishes to discuss this at his follow up with MJS in October.   "

## 2021-04-14 NOTE — TELEPHONE ENCOUNTER
----- Message from Ruddy Simon MD sent at 3/30/2021  6:55 PM EDT -----  Heart monitor did not show A. fib.  Recommend loop recorder implant.  Patient and his family members that I have met thus far do not speak English.  A loop recorder was discussed with him with an  present at his last visit. I also had him hold one to better understand what it felt like since he is blind.  Please set up if possible.  Will need a  present for implant

## 2024-11-21 ENCOUNTER — OFFICE VISIT (OUTPATIENT)
Dept: CARDIOLOGY | Facility: CLINIC | Age: 64
End: 2024-11-21
Payer: MEDICAID

## 2024-11-21 VITALS
BODY MASS INDEX: 22.88 KG/M2 | HEIGHT: 68 IN | WEIGHT: 151 LBS | DIASTOLIC BLOOD PRESSURE: 78 MMHG | OXYGEN SATURATION: 94 % | HEART RATE: 65 BPM | SYSTOLIC BLOOD PRESSURE: 158 MMHG

## 2024-11-21 DIAGNOSIS — I63.9 CEREBROVASCULAR ACCIDENT (CVA), UNSPECIFIED MECHANISM: ICD-10-CM

## 2024-11-21 DIAGNOSIS — H35.033 BLIND HYPERTENSIVE EYE, BILATERAL: ICD-10-CM

## 2024-11-21 DIAGNOSIS — N18.6 ESRD (END STAGE RENAL DISEASE): Primary | ICD-10-CM

## 2024-11-21 RX ORDER — AMLODIPINE BESYLATE 5 MG/1
5 TABLET ORAL DAILY
Qty: 30 TABLET | Refills: 11 | Status: SHIPPED | OUTPATIENT
Start: 2024-11-21

## 2024-11-21 NOTE — PROGRESS NOTES
"[facility] at [facility]  INITIAL OFFICE CONSULT      Nain Matthews  1960  PCP: Provider, No Known    SUBJECTIVE:   Nain Matthews is a 64 y.o. male seen for a consultation visit regarding the following:     Chief Complaint:   Chief Complaint   Patient presents with    Congestive Heart Failure     NP          Consultation is requested by Nathan Tirado MD for evaluation of Congestive Heart Failure (NP)        History:  64-year-old gentleman who speaks Czech and is just seen in clinic today with a  presents today for consultation regarding congestive heart failure.  Patient was originally followed by Dr. Simon in 2021.  He was scheduled to see Dr. Castro today at [facility] but he came to the clinic at [facility] therefore seen in our office here today.  He has hypertension diabetes end-stage renal disease with hemodialysis Monday Wednesday Fridays.  In addition his previous left MCA November 2020 status post tPA and failed mechanical thrombectomy.  He has had a JUVENCIO revealing small PFO 2020.  After this event he did wear a Monitor that was not completed.  Reviewing the chart from 2020 there was no history of A-fib.  He follows with  for most of his care including family physician and eye doctor regarding recent retinopathy.  However it seems that in the past year he tells me that he has been \"praying to God\" and stopped a lot of his medications.  He states he only takes 1 pill a day now.  He does sometimes go to dialysis but not always.  He has been noncompliant with his medications as he \"ran out\" of some his blood pressure medicines.      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  JUVENCIO with small PFO and severely enlarged left atrium.  Mild, chronic systolic heart failure  Diabetes mellitus  ESRD on HD  Followed by Dr. Tirado  Hypertension on " metoprolol therapy.  Hyperlipidemia  Hyphema of left eye, proliferative diabetic retinopathy, retinal detachment followed by  Dr. Shahram ORTA  Medical noncompliance      Past Medical History, Past Surgical History, Family history, Social History, and Medications were all reviewed with the patient today and updated as necessary.     Current Outpatient Medications   Medication Sig Dispense Refill    metoprolol tartrate (LOPRESSOR) 50 MG tablet Take 1 tablet by mouth 2 (Two) Times a Day.      warfarin (COUMADIN) 4 MG tablet Take 1 tablet by mouth Daily.      amLODIPine (NORVASC) 10 MG tablet Take 1 tablet by mouth Daily. (Patient not taking: Reported on 11/21/2024) 30 tablet 0    Propylene Glycol (Systane Complete) 0.6 % solution Apply 1-2 drops to eye(s) as directed by provider As Needed. (Patient not taking: Reported on 11/21/2024)       No current facility-administered medications for this visit.     Allergies   Allergen Reactions    Levofloxacin Unknown - Low Severity    Latex Rash         Past Medical History:   Diagnosis Date    Diabetes mellitus     HTN (hypertension)     Hypertension     Stroke      Past Surgical History:   Procedure Laterality Date    INTERVENTIONAL RADIOLOGY PROCEDURE N/A 11/26/2020    Procedure: IR mechanical thrombectomy;  Surgeon: Aldo Jha MD;  Location: [facility] INVASIVE LOC[facility];  Service: Interventional Radiology;  Laterality: N/A;     Family History   Problem Relation Age of Onset    Hypertension Father      Social History     Tobacco Use    Smoking status: Never     Passive exposure: Never    Smokeless tobacco: Never   Substance Use Topics    Alcohol use: Not Currently       ROS:  Review of Symptoms:  General: +fatigue  Skin: no rashes, lumps, or other skin changes  HEENT: no dizziness, lightheadedness, or vision changes  Respiratory: no cough or hemoptysis  Cardiovascular: no palpitations, and tachycardia  Gastrointestinal: no black/tarry stools or diarrhea  Urinary: no  "change in frequency or urgency  Peripheral Vascular: no claudication or leg cramps  Musculoskeletal: no muscle or joint pain/stiffness  Psychiatric: no depression or excessive stress  Neurological: no sensory or motor loss, no syncope  Hematologic: no anemia, easy bruising or bleeding  Endocrine: no thyroid problems, nor heat or cold intolerance         PHYSICAL EXAM:   /78 (BP Location: Right arm, Patient Position: Sitting, Cuff Size: Adult)   Pulse 65   Ht 172.7 cm (68\")   Wt 68.5 kg (151 lb)   SpO2 94%   BMI 22.96 kg/m²      Wt Readings from Last 5 Encounters:   11/21/24 68.5 kg (151 lb)   03/01/21 68.9 kg (152 lb)   01/18/21 69.9 kg (154 lb)   12/01/20 68.9 kg (152 lb)   11/28/20 68.9 kg (152 lb)     BP Readings from Last 5 Encounters:   11/21/24 158/78   03/01/21 144/78   01/18/21 148/82   12/01/20 158/91   11/26/20 148/81       General-Well Nourished, Well developed  Eyes - PERRLA  Neck- supple, No mass  CV- regular rate and rhythm, no MRG  Lung- clear bilaterally  Abd- soft, +BS  Musc/skel - Norm strength and range of motion  Skin- warm and dry  Neuro - Alert & Oriented x 3, appropriate mood.    Patient's external notes were reviewed.  Independent interpretation of test performed by another physician in facility were reviewed.  Outside laboratory data was also reviewed.    Medical problems and test results were reviewed with the patient today.           Lab Results   Component Value Date    CHOLESTEROL 76 11/27/2020    HDL CHOL 34 (L) 11/27/2020    LDL CHOL 27 11/27/2020    LDL/HDL RATIO 0.85 11/27/2020    VLDL CHOL 15 11/27/2020    TRIGLYCERIDES 65 11/27/2020       EKG:  (EKG/Tracing has been independently visualized by me and summarized below)      ECG 12 Lead    Date/Time: 11/21/2024 12:53 PM  Performed by: Tad Weeks PA    Authorized by: Tad Weeks PA  Comparison: not compared with previous ECG   Rhythm: sinus rhythm  Ectopy: atrial premature contractions  Rate: " normal  Conduction: conduction normal  ST Segments: ST segments normal  QRS axis: normal    Clinical impression: normal ECG          ASSESSMENT   1.  Congestive heart failure/lower extremity edema: Previous JUVENCIO 2020 normal LV function.  Will review echocardiogram same-day visit [facility].    2.  Hypertension, metoprolol, amlodipine.  Patient recently started medication noncompliant in the past running out of prescriptions.  Continue Lopressor, restart amlodipine.  3.  End-stage renal disease, followed by nephrology dialysis Monday Wednesday Friday    4.  Left MCA CVA November 2020 status post tPA therapy failed mechanical thrombectomy.  JUVENCIO at that time small PFO normal LV function.  Coumadin therapy.    5.  Coumadin therapy: Patient states he stopped his Coumadin a year ago.  He felt he was doing and no longer needed this.  I discussed with him through  would be recommended to restart anticoagulation due to his risk of stroke.  He denies any recurrent bleeding issues.  We will start send his Coumadin he was on before remotely in the PT in our clinic.  He states he can get labs through his hemodialysis clinic.    PLAN  Restart amlodipine 5 mg daily  Coumadin restart medication 4 mg daily PT/INR with PT clinic he can do labs through hemodialysis clinic.  We discussed the risk and benefits of anticoagulation due to his previous stroke is high risk for recurrent stroke.  States he will restart medication follow-up INR's as scheduled. Echocardiogram same-day visit with follow-up in 1 month [facility].         Cardiology/Electrophysiology  11/21/24  12:18 EST  Electronically signed by AFTAB Suazo, 11/21/24, 12:55 PM EST.

## 2024-11-27 ENCOUNTER — TELEPHONE (OUTPATIENT)
Dept: PHARMACY | Facility: HOSPITAL | Age: 64
End: 2024-11-27
Payer: MEDICAID

## 2024-11-27 NOTE — TELEPHONE ENCOUNTER
Spoke to patient today regarding [facility] clinic referral (with  ID [id number]). Patient seemed very confused regarding his medications. Was unsure if he is or is not taking warfarin, did not know what warfarin was, explained it was his blood thinner and he was unsure if he takes it.   Per chart, has been on warfarin 4mg at least 4 years but no recent fill and last INR was from 07/2023.   Patient has other appts on 12/12 and agreed to come to clinic that day, scheduled for 12/12 at 1300. Patient hung up on  twice throughout the call.     Indication: cerebrovascular accident (CVA)  Goal INR: 2.0-2.5  Ref provider: AFTAB Gutierrez

## 2024-12-12 ENCOUNTER — HOSPITAL ENCOUNTER (OUTPATIENT)
Facility: HOSPITAL | Age: 64
Discharge: HOME | End: 2024-12-12
Admitting: PHYSICIAN ASSISTANT
Payer: MEDICAID

## 2024-12-12 ENCOUNTER — OFFICE VISIT (OUTPATIENT)
Dept: CARDIOLOGY | Facility: CLINIC | Age: 64
End: 2024-12-12
Payer: MEDICAID

## 2024-12-12 VITALS
BODY MASS INDEX: 22.2 KG/M2 | SYSTOLIC BLOOD PRESSURE: 174 MMHG | DIASTOLIC BLOOD PRESSURE: 83 MMHG | HEIGHT: 69 IN | WEIGHT: 149.91 LBS

## 2024-12-12 VITALS
HEART RATE: 67 BPM | OXYGEN SATURATION: 99 % | SYSTOLIC BLOOD PRESSURE: 160 MMHG | WEIGHT: 150 LBS | DIASTOLIC BLOOD PRESSURE: 82 MMHG | HEIGHT: 69 IN | BODY MASS INDEX: 22.22 KG/M2

## 2024-12-12 DIAGNOSIS — I10 HYPERTENSION, UNSPECIFIED TYPE: Primary | ICD-10-CM

## 2024-12-12 DIAGNOSIS — N18.6 ESRD (END STAGE RENAL DISEASE): ICD-10-CM

## 2024-12-12 LAB
ASCENDING AORTA: 3.9 CM
BH CV ECHO MEAS - AI P1/2T: 510.7 MSEC
BH CV ECHO MEAS - AO MAX PG: 4.8 MMHG
BH CV ECHO MEAS - AO MEAN PG: 3 MMHG
BH CV ECHO MEAS - AO ROOT DIAM: 3.9 CM
BH CV ECHO MEAS - AO V2 MAX: 109 CM/SEC
BH CV ECHO MEAS - AO V2 VTI: 25.6 CM
BH CV ECHO MEAS - AVA(I,D): 1.87 CM2
BH CV ECHO MEAS - EDV(CUBED): 157.5 ML
BH CV ECHO MEAS - EDV(MOD-SP2): 121 ML
BH CV ECHO MEAS - EDV(MOD-SP4): 91.1 ML
BH CV ECHO MEAS - EF(MOD-BP): 35 %
BH CV ECHO MEAS - EF(MOD-SP2): 41.2 %
BH CV ECHO MEAS - EF(MOD-SP4): 33.3 %
BH CV ECHO MEAS - ESV(CUBED): 74.1 ML
BH CV ECHO MEAS - ESV(MOD-SP2): 71.2 ML
BH CV ECHO MEAS - ESV(MOD-SP4): 60.8 ML
BH CV ECHO MEAS - FS: 22.2 %
BH CV ECHO MEAS - IVS/LVPW: 1.25 CM
BH CV ECHO MEAS - IVSD: 1 CM
BH CV ECHO MEAS - LA DIMENSION: 3.9 CM
BH CV ECHO MEAS - LAT PEAK E' VEL: 7.2 CM/SEC
BH CV ECHO MEAS - LV MASS(C)D: 180.1 GRAMS
BH CV ECHO MEAS - LV MAX PG: 2.02 MMHG
BH CV ECHO MEAS - LV MEAN PG: 1 MMHG
BH CV ECHO MEAS - LV V1 MAX: 71.1 CM/SEC
BH CV ECHO MEAS - LV V1 VTI: 15.2 CM
BH CV ECHO MEAS - LVIDD: 5.4 CM
BH CV ECHO MEAS - LVIDS: 4.2 CM
BH CV ECHO MEAS - LVOT AREA: 3.1 CM2
BH CV ECHO MEAS - LVOT DIAM: 2 CM
BH CV ECHO MEAS - LVPWD: 0.8 CM
BH CV ECHO MEAS - MED PEAK E' VEL: 6.5 CM/SEC
BH CV ECHO MEAS - MV DEC SLOPE: 331 CM/SEC2
BH CV ECHO MEAS - MV DEC TIME: 0.32 SEC
BH CV ECHO MEAS - MV E MAX VEL: 106 CM/SEC
BH CV ECHO MEAS - MV MAX PG: 6.9 MMHG
BH CV ECHO MEAS - MV MEAN PG: 3 MMHG
BH CV ECHO MEAS - MV V2 VTI: 29.3 CM
BH CV ECHO MEAS - MVA(VTI): 1.63 CM2
BH CV ECHO MEAS - PA ACC TIME: 0.09 SEC
BH CV ECHO MEAS - PA V2 MAX: 78.9 CM/SEC
BH CV ECHO MEAS - PI END-D VEL: 108 CM/SEC
BH CV ECHO MEAS - RAP SYSTOLE: 8 MMHG
BH CV ECHO MEAS - RVSP: 50 MMHG
BH CV ECHO MEAS - SV(LVOT): 47.8 ML
BH CV ECHO MEAS - SV(MOD-SP2): 49.8 ML
BH CV ECHO MEAS - SV(MOD-SP4): 30.3 ML
BH CV ECHO MEAS - TAPSE (>1.6): 1.46 CM
BH CV ECHO MEAS - TR MAX PG: 42 MMHG
BH CV ECHO MEAS - TR MAX VEL: 321.4 CM/SEC
BH CV ECHO MEASUREMENTS AVERAGE E/E' RATIO: 15.47
BH CV VAS BP RIGHT ARM: NORMAL MMHG
BH CV XLRA - RV BASE: 3.9 CM
BH CV XLRA - RV LENGTH: 7.2 CM
BH CV XLRA - RV MID: 3.7 CM
BH CV XLRA - TDI S': 6.9 CM/SEC

## 2024-12-12 PROCEDURE — 3079F DIAST BP 80-89 MM HG: CPT | Performed by: INTERNAL MEDICINE

## 2024-12-12 PROCEDURE — 99214 OFFICE O/P EST MOD 30 MIN: CPT | Performed by: INTERNAL MEDICINE

## 2024-12-12 PROCEDURE — 3077F SYST BP >= 140 MM HG: CPT | Performed by: INTERNAL MEDICINE

## 2024-12-12 PROCEDURE — 25010000002 SULFUR HEXAFLUORIDE MICROSPH 60.7-25 MG RECONSTITUTED SUSPENSION: Performed by: PHYSICIAN ASSISTANT

## 2024-12-12 PROCEDURE — 93306 TTE W/DOPPLER COMPLETE: CPT

## 2024-12-12 RX ORDER — AMLODIPINE BESYLATE 10 MG/1
10 TABLET ORAL DAILY
Qty: 90 TABLET | Refills: 3 | Status: SHIPPED | OUTPATIENT
Start: 2024-12-12

## 2024-12-12 RX ADMIN — SULFUR HEXAFLUORIDE 2 ML: KIT at 12:11

## 2024-12-12 NOTE — PROGRESS NOTES
"Chief Complaint  Paroxysmal atrial fibrillation and ESRD      Subjective   History of Present Illness    Problem List  -Minimally reduced EF  -Left MCA CVA 2020 status post TPA and failed mechanical thrombectomy  -small pfo, never competed arrhythmia monitor  -DM  -ESRD  -HTN  -HLD  -Diabetic retinopathy and retinal detachment  -medical non compliance  -EKG NSR, non specific ST changes, QT borderline    Mr. Matthews is a 64 year old being seen for the above.  No CV complaints.  Has taken self off meds in past.  Not overly concerned about aggressive medical therapy.  BP elevated today.  ROS negative except for the above.           Objective   Vital Signs:  Vitals:    12/12/24 1126   BP: 160/82   Pulse: 67   SpO2: 99%     Estimated body mass index is 22.22 kg/m² as calculated from the following:    Height as of this encounter: 175 cm (68.9\").    Weight as of this encounter: 68 kg (150 lb).       Physical Exam  HENT:      Head: Normocephalic.   Eyes:      Extraocular Movements: Extraocular movements intact.   Cardiovascular:      Rate and Rhythm: Normal rate and regular rhythm.      Heart sounds: Murmur heard.      No gallop.   Pulmonary:      Breath sounds: Normal breath sounds.   Abdominal:      Palpations: Abdomen is soft.   Musculoskeletal:      Right lower leg: No edema.      Left lower leg: No edema.   Skin:     General: Skin is warm and dry.   Neurological:      General: No focal deficit present.      Mental Status: He is alert.   Psychiatric:         Mood and Affect: Mood normal.               Assessment   -Minimally reduced EF  -Left MCA CVA 2020 status post TPA and failed mechanical thrombectomy  -small pfo, never competed arrhythmia monitor  -DM  -ESRD  -HTN  -HLD  -Diabetic retinopathy and retinal detachment  -medical non compliance  -EKG NSR, non specific ST changes, QT borderline    Plan   -echo pending from today  -cont. Metoprolol, increase amlodipine to 10mg  -will discuss further med titration at next " appoitment    Return in about 6 weeks (around 1/23/2025).  Venkatesh aCstro MD  12/12/2024 11:59 EST

## 2025-01-28 ENCOUNTER — TELEPHONE (OUTPATIENT)
Dept: PHARMACY | Facility: HOSPITAL | Age: 65
End: 2025-01-28

## 2025-01-28 NOTE — TELEPHONE ENCOUNTER
Called [employer] and asked them to call patient to inform him of his no-show and that the clinic has been trying to establish contact with him. Cardiology notes indicate patient is still taking warfarin. Will send message to referring provider if the clinic gets no response from patient.     [employer] ID #: [id number]     Lisbeth Brumfield CPhT   14:00 EST 1/28/2025

## 2025-03-06 ENCOUNTER — TELEPHONE (OUTPATIENT)
Dept: PHARMACY | Facility: HOSPITAL | Age: 65
End: 2025-03-06

## 2025-03-06 NOTE — TELEPHONE ENCOUNTER
Closing episode of care with the clinic after multiple unsuccessful attempts to contact patient. Messaged Dr. Weeks and his staff confirmed we may dismiss this patient.     Lisbeth Brumfield CPhT   10:17 EST 3/6/2025

## 2025-04-17 ENCOUNTER — OFFICE VISIT (OUTPATIENT)
Dept: CARDIOLOGY | Facility: CLINIC | Age: 65
End: 2025-04-17
Payer: MEDICAID

## 2025-04-17 VITALS
OXYGEN SATURATION: 99 % | SYSTOLIC BLOOD PRESSURE: 126 MMHG | HEIGHT: 69 IN | BODY MASS INDEX: 22.02 KG/M2 | WEIGHT: 148.7 LBS | HEART RATE: 55 BPM | DIASTOLIC BLOOD PRESSURE: 68 MMHG

## 2025-04-17 DIAGNOSIS — I10 HYPERTENSION, UNSPECIFIED TYPE: Primary | ICD-10-CM

## 2025-04-17 RX ORDER — METOPROLOL SUCCINATE 50 MG/1
50 TABLET, EXTENDED RELEASE ORAL DAILY
Qty: 90 TABLET | Refills: 3 | Status: SHIPPED | OUTPATIENT
Start: 2025-04-17

## 2025-04-17 RX ORDER — VALSARTAN 40 MG/1
20 TABLET ORAL DAILY
Qty: 45 TABLET | Refills: 3 | Status: SHIPPED | OUTPATIENT
Start: 2025-04-17

## 2025-04-17 NOTE — PROGRESS NOTES
"Chief Complaint  Follow-up (6 week follow up)      Subjective   History of Present Illness    Problem List  -systolic dysfunction, LVEF 35%  -Left MCA CVA 2020 status post TPA and failed mechanical thrombectomy  -small pfo, never competed arrhythmia monitor  -DM  -ESRD  -HTN  -HLD  -Diabetic retinopathy and retinal detachment  -medical non compliance  -EKG NSR, non specific ST changes, QT borderline    Mr. Matthews is a 64 year old being seen for the above.  No CV complaints.  Has taken self off meds in past.  Not overly concerned about aggressive medical therapy.  BP elevated today.  ROS negative except for the above.      Update 4/17/25  No CV complaints.  Discussed his weak heart today.  Amenable to some drug tirtation.           Objective   Vital Signs:  Vitals:    04/17/25 1041   BP: 126/68   Pulse: 55   SpO2: 99%     Estimated body mass index is 22.02 kg/m² as calculated from the following:    Height as of this encounter: 175 cm (68.9\").    Weight as of this encounter: 67.4 kg (148 lb 11.2 oz).       Physical Exam  HENT:      Head: Normocephalic.   Eyes:      Extraocular Movements: Extraocular movements intact.   Cardiovascular:      Rate and Rhythm: Normal rate and regular rhythm.      Heart sounds: Murmur heard.      No gallop.   Pulmonary:      Breath sounds: Normal breath sounds.   Abdominal:      Palpations: Abdomen is soft.   Musculoskeletal:      Right lower leg: No edema.      Left lower leg: No edema.   Skin:     General: Skin is warm and dry.   Neurological:      General: No focal deficit present.      Mental Status: He is alert.   Psychiatric:         Mood and Affect: Mood normal.               Assessment   -Minimally reduced EF  -Left MCA CVA 2020 status post TPA and failed mechanical thrombectomy  -small pfo, never competed arrhythmia monitor  -DM  -ESRD  -HTN  -HLD  -Diabetic retinopathy and retinal detachment  -medical non compliance  -EKG NSR, non specific ST changes, QT borderline    Plan "   -change to metoprolol succinate 50 mg, adding valsartan 20mg  -cont. Amlodipine  -in general compliance a concern.  Will titrate GDMT as able.        Return in about 1 month (around 5/17/2025).  Venkatesh Castro MD  12/12/2024 11:59 EST

## 2025-05-29 ENCOUNTER — OFFICE VISIT (OUTPATIENT)
Dept: CARDIOLOGY | Facility: CLINIC | Age: 65
End: 2025-05-29
Payer: MEDICAID

## 2025-05-29 VITALS
HEIGHT: 69 IN | WEIGHT: 149.5 LBS | BODY MASS INDEX: 22.14 KG/M2 | SYSTOLIC BLOOD PRESSURE: 128 MMHG | HEART RATE: 64 BPM | OXYGEN SATURATION: 98 % | DIASTOLIC BLOOD PRESSURE: 68 MMHG

## 2025-05-29 DIAGNOSIS — N18.6 ESRD (END STAGE RENAL DISEASE): ICD-10-CM

## 2025-05-29 DIAGNOSIS — I10 HYPERTENSION, UNSPECIFIED TYPE: Primary | ICD-10-CM

## 2025-05-29 NOTE — PROGRESS NOTES
"Chief Complaint  Follow-up (1 month follow up)      Subjective   History of Present Illness    Problem List  -systolic dysfunction, LVEF 35%  -Left MCA CVA 2020 status post TPA and failed mechanical thrombectomy  -small pfo, never competed arrhythmia monitor  -DM  -ESRD  -HTN  -HLD  -Diabetic retinopathy and retinal detachment  -medical non compliance  -EKG NSR, non specific ST changes, QT borderline    Mr. Matthews is a 64 year old being seen for the above.  No CV complaints.  Has taken self off meds in past.  Not overly concerned about aggressive medical therapy.  BP elevated today.  ROS negative except for the above.      Update 4/17/25  No CV complaints.  Discussed his weak heart today.  Amenable to some drug tirtation.        Update 5/29/25  No complaints.  Took himself off medication in past.           Objective   Vital Signs:  Vitals:    05/29/25 1103   BP: 128/68   Pulse: 64   SpO2: 98%     Estimated body mass index is 22.14 kg/m² as calculated from the following:    Height as of this encounter: 175 cm (68.9\").    Weight as of this encounter: 67.8 kg (149 lb 8 oz).       Physical Exam  HENT:      Head: Normocephalic.   Eyes:      Extraocular Movements: Extraocular movements intact.   Cardiovascular:      Rate and Rhythm: Normal rate and regular rhythm.      Heart sounds: Murmur heard.      No gallop.   Pulmonary:      Breath sounds: Normal breath sounds.   Abdominal:      Palpations: Abdomen is soft.   Musculoskeletal:      Right lower leg: No edema.      Left lower leg: No edema.   Skin:     General: Skin is warm and dry.   Neurological:      General: No focal deficit present.      Mental Status: He is alert.   Psychiatric:         Mood and Affect: Mood normal.               Assessment   -systolic dysfunction, LVEF 35%  -Left MCA CVA 2020 status post TPA and failed mechanical thrombectomy  -small pfo, never competed arrhythmia monitor  -DM  -ESRD  -HTN  -HLD  -Diabetic retinopathy and retinal " detachment  -medical non compliance  -EKG NSR, non specific ST changes, QT borderline    Plan   -cont. Metoprolol.  -took himself off valsartan.  Discussed med titration.  He is not interested.    -at this point will hold on drug titration.  -will consider further aggressive treatment if he desires.  At this point he takes himself off his medication.        Return in about 6 months (around 11/29/2025).  Venkatesh Castro MD

## (undated) DEVICE — BALLOON GUIDE CATHETER: Brand: FLOWGATE2

## (undated) DEVICE — LEX NEURO ANGIOGRAPHY: Brand: MEDLINE INDUSTRIES, INC.

## (undated) DEVICE — CATH TEMPO 5F BER 100CM: Brand: TEMPO

## (undated) DEVICE — DELIVERY ASSIST CATHETER: Brand: AXS OFFSET

## (undated) DEVICE — TREVO XP PROVUE RETRIEVER: Brand: TREVO

## (undated) DEVICE — DISTAL ACCESS CATHETER: Brand: AXS CATALYST 6

## (undated) DEVICE — STPCK 3/WY HP M/RA W/OFF/HNDL 1050PSI STRL

## (undated) DEVICE — RADIFOCUS GLIDEWIRE: Brand: GLIDEWIRE

## (undated) DEVICE — LIMB HOLDER, WRIST/ANKLE: Brand: DEROYAL

## (undated) DEVICE — PINNACLE INTRODUCER SHEATH: Brand: PINNACLE

## (undated) DEVICE — ST INF PRI SMRTSTE 20DRP 2VLV 24ML 117

## (undated) DEVICE — CATH MIC PHENOM .021 .018IN 160CM

## (undated) DEVICE — GUIDEWIRE WITH ICE™ HYDROPHILIC COATING: Brand: TRANSEND™ EX

## (undated) DEVICE — ST ACC MICROPUNCTURE .018 TRANSLSS/SS/TP 5F/10CM 21G/7CM

## (undated) DEVICE — RETRV SOLITAIRE2 .021IN 4X40MM

## (undated) DEVICE — ROTATING HEMOSTATIC VALVE .096": Brand: RHV

## (undated) DEVICE — ANGIO-SEAL VIP VASCULAR CLOSURE DEVICE: Brand: ANGIO-SEAL

## (undated) DEVICE — ST EXT IV SMARTSITE 2VLV SP M LL 5ML IV1

## (undated) DEVICE — SKIN PREP TRAY W/CHG: Brand: MEDLINE INDUSTRIES, INC.

## (undated) DEVICE — Device